# Patient Record
Sex: FEMALE | Employment: OTHER | URBAN - METROPOLITAN AREA
[De-identification: names, ages, dates, MRNs, and addresses within clinical notes are randomized per-mention and may not be internally consistent; named-entity substitution may affect disease eponyms.]

---

## 2020-11-05 ENCOUNTER — HOSPITAL ENCOUNTER (OUTPATIENT)
Age: 75
Setting detail: SPECIMEN
Discharge: HOME | End: 2020-11-05
Payer: MEDICARE

## 2020-11-05 LAB
-: ABNORMAL
ABSOLUTE EOS #: 0.24 K/UL (ref 0–0.44)
ABSOLUTE IMMATURE GRANULOCYTE: 0.07 K/UL (ref 0–0.3)
ABSOLUTE LYMPH #: 2.59 K/UL (ref 1.1–3.7)
ABSOLUTE MONO #: 0.74 K/UL (ref 0.1–1.2)
ALBUMIN SERPL-MCNC: 4 G/DL (ref 3.5–5.2)
ALBUMIN/GLOBULIN RATIO: 1.3 (ref 1–2.5)
ALP BLD-CCNC: 57 U/L (ref 35–104)
ALT SERPL-CCNC: 18 U/L (ref 5–33)
AMORPHOUS: ABNORMAL
ANION GAP SERPL CALCULATED.3IONS-SCNC: 12 MMOL/L (ref 9–17)
AST SERPL-CCNC: 12 U/L
BACTERIA: ABNORMAL
BASOPHILS # BLD: 1 % (ref 0–2)
BASOPHILS ABSOLUTE: 0.07 K/UL (ref 0–0.2)
BILIRUB SERPL-MCNC: 0.24 MG/DL (ref 0.3–1.2)
BILIRUBIN URINE: NEGATIVE
BUN BLDV-MCNC: 18 MG/DL (ref 8–23)
BUN/CREAT BLD: ABNORMAL (ref 9–20)
CALCIUM SERPL-MCNC: 9.8 MG/DL (ref 8.6–10.4)
CASTS UA: ABNORMAL /LPF (ref 0–8)
CHLORIDE BLD-SCNC: 103 MMOL/L (ref 98–107)
CHOLESTEROL/HDL RATIO: 4.1
CHOLESTEROL: 131 MG/DL
CO2: 26 MMOL/L (ref 20–31)
COLOR: YELLOW
COMMENT UA: ABNORMAL
CREAT SERPL-MCNC: 0.77 MG/DL (ref 0.5–0.9)
CRYSTALS, UA: ABNORMAL /HPF
DIFFERENTIAL TYPE: ABNORMAL
EOSINOPHILS RELATIVE PERCENT: 2 % (ref 1–4)
EPITHELIAL CELLS UA: ABNORMAL /HPF (ref 0–5)
ESTIMATED AVERAGE GLUCOSE: 128 MG/DL
GFR AFRICAN AMERICAN: >60 ML/MIN
GFR NON-AFRICAN AMERICAN: >60 ML/MIN
GFR SERPL CREATININE-BSD FRML MDRD: ABNORMAL ML/MIN/{1.73_M2}
GFR SERPL CREATININE-BSD FRML MDRD: ABNORMAL ML/MIN/{1.73_M2}
GLUCOSE BLD-MCNC: 120 MG/DL (ref 70–99)
GLUCOSE URINE: NEGATIVE
HBA1C MFR BLD: 6.1 % (ref 4–6)
HCT VFR BLD CALC: 41.4 % (ref 36.3–47.1)
HDLC SERPL-MCNC: 32 MG/DL
HEMOGLOBIN: 12.6 G/DL (ref 11.9–15.1)
IMMATURE GRANULOCYTES: 1 %
KETONES, URINE: NEGATIVE
LDL CHOLESTEROL: 56 MG/DL (ref 0–130)
LEUKOCYTE ESTERASE, URINE: ABNORMAL
LYMPHOCYTES # BLD: 21 % (ref 24–43)
MCH RBC QN AUTO: 30.2 PG (ref 25.2–33.5)
MCHC RBC AUTO-ENTMCNC: 30.4 G/DL (ref 28.4–34.8)
MCV RBC AUTO: 99.3 FL (ref 82.6–102.9)
MONOCYTES # BLD: 6 % (ref 3–12)
MUCUS: ABNORMAL
NITRITE, URINE: NEGATIVE
NRBC AUTOMATED: 0 PER 100 WBC
OTHER OBSERVATIONS UA: ABNORMAL
PDW BLD-RTO: 13.6 % (ref 11.8–14.4)
PH UA: 5 (ref 5–8)
PLATELET # BLD: 332 K/UL (ref 138–453)
PLATELET ESTIMATE: ABNORMAL
PMV BLD AUTO: 11 FL (ref 8.1–13.5)
POTASSIUM SERPL-SCNC: 3.7 MMOL/L (ref 3.7–5.3)
PROTEIN UA: ABNORMAL
RBC # BLD: 4.17 M/UL (ref 3.95–5.11)
RBC # BLD: ABNORMAL 10*6/UL
RBC UA: ABNORMAL /HPF (ref 0–4)
RENAL EPITHELIAL, UA: ABNORMAL /HPF
SEG NEUTROPHILS: 69 % (ref 36–65)
SEGMENTED NEUTROPHILS ABSOLUTE COUNT: 8.8 K/UL (ref 1.5–8.1)
SODIUM BLD-SCNC: 141 MMOL/L (ref 135–144)
SPECIFIC GRAVITY UA: 1.02 (ref 1–1.03)
TOTAL PROTEIN: 7.2 G/DL (ref 6.4–8.3)
TRICHOMONAS: ABNORMAL
TRIGL SERPL-MCNC: 217 MG/DL
TSH SERPL DL<=0.05 MIU/L-ACNC: 1.3 MIU/L (ref 0.3–5)
TURBIDITY: ABNORMAL
URINE HGB: ABNORMAL
UROBILINOGEN, URINE: NORMAL
VITAMIN D 25-HYDROXY: 41.7 NG/ML (ref 30–100)
VLDLC SERPL CALC-MCNC: ABNORMAL MG/DL (ref 1–30)
WBC # BLD: 12.5 K/UL (ref 3.5–11.3)
WBC # BLD: ABNORMAL 10*3/UL
WBC UA: ABNORMAL /HPF (ref 0–5)
YEAST: ABNORMAL

## 2023-08-18 ENCOUNTER — HOSPITAL ENCOUNTER (INPATIENT)
Age: 78
LOS: 4 days | Discharge: HOME HEALTH | DRG: 637 | End: 2023-08-22
Attending: EMERGENCY MEDICINE | Admitting: INTERNAL MEDICINE
Payer: MEDICARE

## 2023-08-18 ENCOUNTER — APPOINTMENT (OUTPATIENT)
Dept: CT IMAGING | Age: 78
DRG: 637 | End: 2023-08-18
Payer: MEDICARE

## 2023-08-18 ENCOUNTER — APPOINTMENT (OUTPATIENT)
Dept: GENERAL RADIOLOGY | Age: 78
DRG: 637 | End: 2023-08-18
Payer: MEDICARE

## 2023-08-18 ENCOUNTER — APPOINTMENT (OUTPATIENT)
Dept: ULTRASOUND IMAGING | Age: 78
DRG: 637 | End: 2023-08-18
Payer: MEDICARE

## 2023-08-18 DIAGNOSIS — N17.9 ACUTE KIDNEY INJURY (HCC): Primary | ICD-10-CM

## 2023-08-18 DIAGNOSIS — E16.2 HYPOGLYCEMIA: ICD-10-CM

## 2023-08-18 DIAGNOSIS — I31.39 PERICARDIAL EFFUSION: ICD-10-CM

## 2023-08-18 PROBLEM — N28.9 ACUTE RENAL DISEASE: Status: ACTIVE | Noted: 2023-08-18

## 2023-08-18 LAB
ANION GAP SERPL CALCULATED.3IONS-SCNC: 17 MMOL/L (ref 9–17)
BACTERIA URNS QL MICRO: ABNORMAL
BASOPHILS # BLD: 0.14 K/UL (ref 0–0.2)
BASOPHILS NFR BLD: 1 % (ref 0–2)
BILIRUB UR QL STRIP: NEGATIVE
BUN SERPL-MCNC: 83 MG/DL (ref 8–23)
BUN/CREAT SERPL: 22 (ref 9–20)
CALCIUM SERPL-MCNC: 8.9 MG/DL (ref 8.6–10.4)
CHLORIDE SERPL-SCNC: 97 MMOL/L (ref 98–107)
CHP ED QC CHECK: 108
CLARITY UR: ABNORMAL
CO2 SERPL-SCNC: 21 MMOL/L (ref 20–31)
COLOR UR: YELLOW
CREAT SERPL-MCNC: 3.8 MG/DL (ref 0.5–0.9)
EKG ATRIAL RATE: 84 BPM
EKG P AXIS: 68 DEGREES
EKG P-R INTERVAL: 112 MS
EKG Q-T INTERVAL: 418 MS
EKG QRS DURATION: 100 MS
EKG QTC CALCULATION (BAZETT): 493 MS
EKG R AXIS: 74 DEGREES
EKG T AXIS: 43 DEGREES
EKG VENTRICULAR RATE: 84 BPM
EOSINOPHIL # BLD: 0 K/UL (ref 0–0.44)
EOSINOPHILS RELATIVE PERCENT: 0 % (ref 1–4)
EPI CELLS #/AREA URNS HPF: ABNORMAL /HPF (ref 0–5)
ERYTHROCYTE [DISTWIDTH] IN BLOOD BY AUTOMATED COUNT: 16.4 % (ref 11.8–14.4)
GFR SERPL CREATININE-BSD FRML MDRD: 12 ML/MIN/1.73M2
GLUCOSE BLD-MCNC: 108 MG/DL (ref 65–105)
GLUCOSE BLD-MCNC: 18 MG/DL (ref 65–105)
GLUCOSE BLD-MCNC: 29 MG/DL (ref 65–105)
GLUCOSE BLD-MCNC: 48 MG/DL (ref 65–105)
GLUCOSE BLD-MCNC: 49 MG/DL (ref 65–105)
GLUCOSE BLD-MCNC: 52 MG/DL (ref 65–105)
GLUCOSE BLD-MCNC: 57 MG/DL (ref 65–105)
GLUCOSE BLD-MCNC: 61 MG/DL (ref 65–105)
GLUCOSE BLD-MCNC: 69 MG/DL (ref 65–105)
GLUCOSE SERPL-MCNC: 76 MG/DL (ref 70–99)
GLUCOSE UR STRIP-MCNC: NEGATIVE MG/DL
HCT VFR BLD AUTO: 33.4 % (ref 36.3–47.1)
HGB BLD-MCNC: 10.5 G/DL (ref 11.9–15.1)
HGB UR QL STRIP.AUTO: ABNORMAL
IMM GRANULOCYTES # BLD AUTO: 0.14 K/UL (ref 0–0.3)
IMM GRANULOCYTES NFR BLD: 1 %
KETONES UR STRIP-MCNC: NEGATIVE MG/DL
LEUKOCYTE ESTERASE UR QL STRIP: ABNORMAL
LYMPHOCYTES NFR BLD: 0.7 K/UL (ref 1.1–3.7)
LYMPHOCYTES RELATIVE PERCENT: 5 % (ref 24–43)
MCH RBC QN AUTO: 28.3 PG (ref 25.2–33.5)
MCHC RBC AUTO-ENTMCNC: 31.4 G/DL (ref 28.4–34.8)
MCV RBC AUTO: 90 FL (ref 82.6–102.9)
MONOCYTES NFR BLD: 0.28 K/UL (ref 0.1–1.2)
MONOCYTES NFR BLD: 2 % (ref 3–12)
MORPHOLOGY: ABNORMAL
NEUTROPHILS NFR BLD: 91 % (ref 36–65)
NEUTS SEG NFR BLD: 12.64 K/UL (ref 1.5–8.1)
NITRITE UR QL STRIP: NEGATIVE
NRBC BLD-RTO: 0 PER 100 WBC
PH UR STRIP: 5.5 [PH] (ref 5–8)
PLATELET # BLD AUTO: ABNORMAL K/UL (ref 138–453)
PLATELET, FLUORESCENCE: 48 K/UL (ref 138–453)
PLATELETS.RETICULATED NFR BLD AUTO: 21.8 % (ref 1.1–10.3)
POTASSIUM SERPL-SCNC: 3.2 MMOL/L (ref 3.7–5.3)
PROT UR STRIP-MCNC: ABNORMAL MG/DL
RBC # BLD AUTO: 3.71 M/UL (ref 3.95–5.11)
RBC #/AREA URNS HPF: ABNORMAL /HPF (ref 0–2)
SODIUM SERPL-SCNC: 135 MMOL/L (ref 135–144)
SP GR UR STRIP: 1.01 (ref 1–1.03)
URATE SERPL-MCNC: 11.6 MG/DL (ref 2.4–5.7)
UROBILINOGEN UR STRIP-ACNC: NORMAL EU/DL (ref 0–1)
WBC #/AREA URNS HPF: ABNORMAL /HPF (ref 0–5)
WBC OTHER # BLD: 13.9 K/UL (ref 3.5–11.3)
YEAST URNS QL MICRO: ABNORMAL

## 2023-08-18 PROCEDURE — 2580000003 HC RX 258: Performed by: INTERNAL MEDICINE

## 2023-08-18 PROCEDURE — 86335 IMMUNFIX E-PHORSIS/URINE/CSF: CPT

## 2023-08-18 PROCEDURE — 82947 ASSAY GLUCOSE BLOOD QUANT: CPT

## 2023-08-18 PROCEDURE — 70450 CT HEAD/BRAIN W/O DYE: CPT

## 2023-08-18 PROCEDURE — 87040 BLOOD CULTURE FOR BACTERIA: CPT

## 2023-08-18 PROCEDURE — 96374 THER/PROPH/DIAG INJ IV PUSH: CPT

## 2023-08-18 PROCEDURE — 6360000002 HC RX W HCPCS: Performed by: INTERNAL MEDICINE

## 2023-08-18 PROCEDURE — 84550 ASSAY OF BLOOD/URIC ACID: CPT

## 2023-08-18 PROCEDURE — 99285 EMERGENCY DEPT VISIT HI MDM: CPT

## 2023-08-18 PROCEDURE — 87186 SC STD MICRODIL/AGAR DIL: CPT

## 2023-08-18 PROCEDURE — 36415 COLL VENOUS BLD VENIPUNCTURE: CPT

## 2023-08-18 PROCEDURE — 85055 RETICULATED PLATELET ASSAY: CPT

## 2023-08-18 PROCEDURE — 87205 SMEAR GRAM STAIN: CPT

## 2023-08-18 PROCEDURE — 2060000000 HC ICU INTERMEDIATE R&B

## 2023-08-18 PROCEDURE — 84166 PROTEIN E-PHORESIS/URINE/CSF: CPT

## 2023-08-18 PROCEDURE — 87154 CUL TYP ID BLD PTHGN 6+ TRGT: CPT

## 2023-08-18 PROCEDURE — 76770 US EXAM ABDO BACK WALL COMP: CPT

## 2023-08-18 PROCEDURE — 84300 ASSAY OF URINE SODIUM: CPT

## 2023-08-18 PROCEDURE — 81001 URINALYSIS AUTO W/SCOPE: CPT

## 2023-08-18 PROCEDURE — 87077 CULTURE AEROBIC IDENTIFY: CPT

## 2023-08-18 PROCEDURE — 93005 ELECTROCARDIOGRAM TRACING: CPT | Performed by: EMERGENCY MEDICINE

## 2023-08-18 PROCEDURE — 84165 PROTEIN E-PHORESIS SERUM: CPT

## 2023-08-18 PROCEDURE — 86334 IMMUNOFIX E-PHORESIS SERUM: CPT

## 2023-08-18 PROCEDURE — 71045 X-RAY EXAM CHEST 1 VIEW: CPT

## 2023-08-18 PROCEDURE — 6360000002 HC RX W HCPCS: Performed by: EMERGENCY MEDICINE

## 2023-08-18 PROCEDURE — 2500000003 HC RX 250 WO HCPCS

## 2023-08-18 PROCEDURE — 83935 ASSAY OF URINE OSMOLALITY: CPT

## 2023-08-18 PROCEDURE — 84156 ASSAY OF PROTEIN URINE: CPT

## 2023-08-18 PROCEDURE — 2580000003 HC RX 258: Performed by: EMERGENCY MEDICINE

## 2023-08-18 PROCEDURE — 85025 COMPLETE CBC W/AUTO DIFF WBC: CPT

## 2023-08-18 PROCEDURE — 99222 1ST HOSP IP/OBS MODERATE 55: CPT | Performed by: STUDENT IN AN ORGANIZED HEALTH CARE EDUCATION/TRAINING PROGRAM

## 2023-08-18 PROCEDURE — 6370000000 HC RX 637 (ALT 250 FOR IP): Performed by: INTERNAL MEDICINE

## 2023-08-18 PROCEDURE — 80048 BASIC METABOLIC PNL TOTAL CA: CPT

## 2023-08-18 PROCEDURE — 84155 ASSAY OF PROTEIN SERUM: CPT

## 2023-08-18 PROCEDURE — 74018 RADEX ABDOMEN 1 VIEW: CPT

## 2023-08-18 PROCEDURE — 51798 US URINE CAPACITY MEASURE: CPT

## 2023-08-18 PROCEDURE — 86403 PARTICLE AGGLUT ANTBDY SCRN: CPT

## 2023-08-18 RX ORDER — SODIUM CHLORIDE 0.9 % (FLUSH) 0.9 %
10 SYRINGE (ML) INJECTION PRN
Status: DISCONTINUED | OUTPATIENT
Start: 2023-08-18 | End: 2023-08-22 | Stop reason: HOSPADM

## 2023-08-18 RX ORDER — ACETAMINOPHEN 650 MG/1
650 SUPPOSITORY RECTAL EVERY 6 HOURS PRN
Status: DISCONTINUED | OUTPATIENT
Start: 2023-08-18 | End: 2023-08-22 | Stop reason: HOSPADM

## 2023-08-18 RX ORDER — SODIUM CHLORIDE 0.9 % (FLUSH) 0.9 %
5-40 SYRINGE (ML) INJECTION PRN
Status: DISCONTINUED | OUTPATIENT
Start: 2023-08-18 | End: 2023-08-18 | Stop reason: SDUPTHER

## 2023-08-18 RX ORDER — DEXTROSE MONOHYDRATE 100 MG/ML
INJECTION, SOLUTION INTRAVENOUS CONTINUOUS PRN
Status: DISCONTINUED | OUTPATIENT
Start: 2023-08-18 | End: 2023-08-22 | Stop reason: HOSPADM

## 2023-08-18 RX ORDER — POTASSIUM CHLORIDE 20 MEQ/1
40 TABLET, EXTENDED RELEASE ORAL PRN
Status: DISCONTINUED | OUTPATIENT
Start: 2023-08-18 | End: 2023-08-18

## 2023-08-18 RX ORDER — ONDANSETRON 2 MG/ML
4 INJECTION INTRAMUSCULAR; INTRAVENOUS EVERY 6 HOURS PRN
Status: DISCONTINUED | OUTPATIENT
Start: 2023-08-18 | End: 2023-08-22 | Stop reason: HOSPADM

## 2023-08-18 RX ORDER — SODIUM CHLORIDE 9 MG/ML
INJECTION, SOLUTION INTRAVENOUS CONTINUOUS
Status: DISCONTINUED | OUTPATIENT
Start: 2023-08-18 | End: 2023-08-18

## 2023-08-18 RX ORDER — HEPARIN SODIUM 5000 [USP'U]/ML
5000 INJECTION, SOLUTION INTRAVENOUS; SUBCUTANEOUS EVERY 8 HOURS SCHEDULED
Status: DISCONTINUED | OUTPATIENT
Start: 2023-08-18 | End: 2023-08-20

## 2023-08-18 RX ORDER — ONDANSETRON 2 MG/ML
4 INJECTION INTRAMUSCULAR; INTRAVENOUS ONCE
Status: COMPLETED | OUTPATIENT
Start: 2023-08-18 | End: 2023-08-18

## 2023-08-18 RX ORDER — DEXTROSE MONOHYDRATE 100 MG/ML
INJECTION, SOLUTION INTRAVENOUS CONTINUOUS
Status: DISCONTINUED | OUTPATIENT
Start: 2023-08-18 | End: 2023-08-20

## 2023-08-18 RX ORDER — SODIUM CHLORIDE 0.9 % (FLUSH) 0.9 %
5-40 SYRINGE (ML) INJECTION EVERY 12 HOURS SCHEDULED
Status: DISCONTINUED | OUTPATIENT
Start: 2023-08-18 | End: 2023-08-18 | Stop reason: SDUPTHER

## 2023-08-18 RX ORDER — SODIUM CHLORIDE 9 MG/ML
INJECTION, SOLUTION INTRAVENOUS PRN
Status: DISCONTINUED | OUTPATIENT
Start: 2023-08-18 | End: 2023-08-18 | Stop reason: SDUPTHER

## 2023-08-18 RX ORDER — 0.9 % SODIUM CHLORIDE 0.9 %
500 INTRAVENOUS SOLUTION INTRAVENOUS ONCE
Status: COMPLETED | OUTPATIENT
Start: 2023-08-18 | End: 2023-08-18

## 2023-08-18 RX ORDER — MAGNESIUM SULFATE IN WATER 40 MG/ML
2000 INJECTION, SOLUTION INTRAVENOUS PRN
Status: DISCONTINUED | OUTPATIENT
Start: 2023-08-18 | End: 2023-08-18

## 2023-08-18 RX ORDER — SODIUM CHLORIDE 0.9 % (FLUSH) 0.9 %
10 SYRINGE (ML) INJECTION EVERY 12 HOURS SCHEDULED
Status: DISCONTINUED | OUTPATIENT
Start: 2023-08-18 | End: 2023-08-22 | Stop reason: HOSPADM

## 2023-08-18 RX ORDER — SODIUM CHLORIDE 9 MG/ML
INJECTION, SOLUTION INTRAVENOUS PRN
Status: DISCONTINUED | OUTPATIENT
Start: 2023-08-18 | End: 2023-08-22 | Stop reason: HOSPADM

## 2023-08-18 RX ORDER — DEXTROSE MONOHYDRATE 25 G/50ML
INJECTION, SOLUTION INTRAVENOUS
Status: COMPLETED
Start: 2023-08-18 | End: 2023-08-18

## 2023-08-18 RX ORDER — DEXTROSE MONOHYDRATE 25 G/50ML
25 INJECTION, SOLUTION INTRAVENOUS ONCE
Status: COMPLETED | OUTPATIENT
Start: 2023-08-18 | End: 2023-08-18

## 2023-08-18 RX ORDER — ONDANSETRON 4 MG/1
4 TABLET, ORALLY DISINTEGRATING ORAL EVERY 8 HOURS PRN
Status: DISCONTINUED | OUTPATIENT
Start: 2023-08-18 | End: 2023-08-22 | Stop reason: HOSPADM

## 2023-08-18 RX ORDER — ACETAMINOPHEN 325 MG/1
650 TABLET ORAL EVERY 6 HOURS PRN
Status: DISCONTINUED | OUTPATIENT
Start: 2023-08-18 | End: 2023-08-22 | Stop reason: HOSPADM

## 2023-08-18 RX ORDER — POTASSIUM CHLORIDE 7.45 MG/ML
10 INJECTION INTRAVENOUS PRN
Status: DISCONTINUED | OUTPATIENT
Start: 2023-08-18 | End: 2023-08-18

## 2023-08-18 RX ORDER — DEXTROSE AND SODIUM CHLORIDE 5; .9 G/100ML; G/100ML
INJECTION, SOLUTION INTRAVENOUS CONTINUOUS
Status: DISCONTINUED | OUTPATIENT
Start: 2023-08-18 | End: 2023-08-18

## 2023-08-18 RX ADMIN — DEXTROSE MONOHYDRATE 25 G: 25 INJECTION, SOLUTION INTRAVENOUS at 10:22

## 2023-08-18 RX ADMIN — SODIUM CHLORIDE 500 ML: 9 INJECTION, SOLUTION INTRAVENOUS at 12:08

## 2023-08-18 RX ADMIN — SODIUM CHLORIDE: 9 INJECTION, SOLUTION INTRAVENOUS at 09:41

## 2023-08-18 RX ADMIN — HEPARIN SODIUM 5000 UNITS: 5000 INJECTION INTRAVENOUS; SUBCUTANEOUS at 22:56

## 2023-08-18 RX ADMIN — ONDANSETRON 4 MG: 2 INJECTION INTRAMUSCULAR; INTRAVENOUS at 07:39

## 2023-08-18 RX ADMIN — Medication 16 G: at 22:56

## 2023-08-18 RX ADMIN — SODIUM CHLORIDE 500 ML: 9 INJECTION, SOLUTION INTRAVENOUS at 07:39

## 2023-08-18 RX ADMIN — DEXTROSE MONOHYDRATE: 100 INJECTION, SOLUTION INTRAVENOUS at 13:43

## 2023-08-18 ASSESSMENT — ENCOUNTER SYMPTOMS
COLOR CHANGE: 0
VOMITING: 1
CONSTIPATION: 0
EYE REDNESS: 0
ABDOMINAL PAIN: 0
EYE DISCHARGE: 0
NAUSEA: 1
FACIAL SWELLING: 0
SHORTNESS OF BREATH: 0
DIARRHEA: 0
COUGH: 0

## 2023-08-18 NOTE — ED NOTES
Pt found to have altered mental status. POCT glucose 18. 1 amp D50 pushed. D5 and 0.9%NS initiated.       Conchita Romero RN  08/18/23 1092

## 2023-08-18 NOTE — FLOWSHEET NOTE
08/18/23 1620   Treatment Team Notification   Reason for Communication Review case   Name of Team Member Notified Dr Duke Vicente Team Role Attending Provider   Method of Communication Secure Message   Response Waiting for response   Notification Time 26     Dr Joseline Duran notified regarding a fall the pt had at home prior to hospitalization a few days ago where the pt had hit her head, per daughter telling Arnol Navas. When the writer assessed pt there was a slight facial drooping to left side of face. Dr Joseline Duran ordered for pt to have CT of brain wo contrast and consult to neurology.

## 2023-08-18 NOTE — ED NOTES
Requested [facility]  perfectserve admitting for 57 glucose.       Robin Tavera RN  08/18/23 6490

## 2023-08-18 NOTE — ED NOTES
Pt family out to station for socks. Socks placed on pt, safety maintained.       Radha Aguirre RN  08/18/23 2086

## 2023-08-18 NOTE — ED NOTES
Pt to bathroom. Urine sample contaminated with feces. Dr Slater Screws notified.       Violet Curtis RN  08/18/23 5581

## 2023-08-18 NOTE — ED NOTES
S/w Dr Elena Gomez. Give 500ml 0.9% bolus for soft bp. Continue D5 and 0.9%NS infusion for 57 BG. Telephone orders with readback.       Radha Aguirre RN  08/18/23 1538

## 2023-08-18 NOTE — ED PROVIDER NOTES
[facility] ED  EMERGENCY DEPARTMENT ENCOUNTER      Pt Name: Jodi Callaway  MRN: [id number]  [address] 1945  Date of evaluation: 8/18/2023  Provider: Dory Plunkett MD    CHIEF COMPLAINT       Chief Complaint   Patient presents with    Hypoglycemia         HISTORY OF PRESENT ILLNESS  (Location/Symptom, Timing/Onset, Context/Setting, Quality, Duration, Modifying Factors, Severity.)   Jodi Callaway is a 77 y.o. female who presents to the emergency department for low blood sugar. Paramedics checked her blood sugar and it was low and they gave her D50 and she is now awake and alert and talking. She vomited. She does not seem to complain of any pain or shortness of breath. She has not checked her temperature at home. Nursing Notes were reviewed. ALLERGIES     Patient has no allergy information on record. CURRENT MEDICATIONS       Previous Medications    No medications on file       PAST MEDICAL HISTORY   No past medical history on file. SURGICAL HISTORY     No past surgical history on file. FAMILY HISTORY     No family history on file. No family status information on file. SOCIAL HISTORY          REVIEW OF SYSTEMS    (2-9 systems for level 4, 10 or more for level 5)     Review of Systems   Constitutional:  Negative for chills, fatigue and fever. HENT:  Negative for congestion, ear discharge and facial swelling. Eyes:  Negative for discharge and redness. Respiratory:  Negative for cough and shortness of breath. Cardiovascular:  Negative for chest pain. Gastrointestinal:  Positive for nausea and vomiting. Negative for abdominal pain, constipation and diarrhea. Genitourinary:  Negative for dysuria and hematuria. Musculoskeletal:  Negative for arthralgias. Skin:  Negative for color change and rash. Neurological:  Negative for syncope, numbness and headaches. Hematological:  Negative for adenopathy. Psychiatric/Behavioral:  Negative for confusion.  The patient is

## 2023-08-18 NOTE — H&P
History & Physical  [facility].,    Adult Hospitalist      Name: Kaylan Chaudhry  MRN: [id number]     Acct: [de-identified]  Room: [facility]/    Admit Date: 8/18/2023  7:17 AM  PCP: Rip Gomez MD    Primary Problem  Principal Problem:    Acute kidney injury [facility])  Active Problems:    Acute renal disease  Resolved Problems:    * No resolved hospital problems. *        Assesment:     Hypoglycemia  Acute renal failure, likely prerenal  Approximately encephalopathy  Diabetes type 2  Leukocytosis        Plan:   Admit to medical floor on telemetry  Check vitals closely  Start D5 normal 100 cc/h  Hold oral hypoglycemic agents for now  Check retroperitoneal ultrasound  Check was for residual  Consult nephrology  Obtain urinalysis  Close monitoring of the electrolytes and kidney function  Blood cultures x2  Heparin for DVT prophylaxis  Continue to monitor/telemetry/CBC with differential daily/BMP daily  PT and OT  for discharge planning  Continue medications as below      Scheduled Meds:    Continuous Infusions:   sodium chloride 75 mL/hr at 08/18/23 0941     PRN Meds:       Chief Complaint:     Chief Complaint   Patient presents with    Hypoglycemia         History of Present Illness: Kaylan Chaudhry is a 77 y.o.  female who presents with abdominal status, decreased level of consciousness, EMS checked her blood sugar and was found to be significantly low, she received D50 and she was brought to the hospital, blood work in the ER showed acute renal failure with creatinine of 3.8, white blood cells 13.9. I have personally reviewed the past medical history, past surgical history, medications, social history, and family history, and summarized in the note. Review of Systems:     All 10 point system is reviewed and negative otherwise mentioned in HPI. Past Medical History:     No past medical history on file. Past Surgical History:     No past surgical history on file.      Medications

## 2023-08-19 ENCOUNTER — APPOINTMENT (OUTPATIENT)
Dept: CT IMAGING | Age: 78
DRG: 637 | End: 2023-08-19
Payer: MEDICARE

## 2023-08-19 PROBLEM — G92.8 TOXIC METABOLIC ENCEPHALOPATHY: Status: ACTIVE | Noted: 2023-08-19

## 2023-08-19 PROBLEM — E16.2 HYPOGLYCEMIA: Status: ACTIVE | Noted: 2023-08-19

## 2023-08-19 LAB
ANION GAP SERPL CALCULATED.3IONS-SCNC: 13 MMOL/L (ref 9–17)
BASOPHILS # BLD: 0 K/UL (ref 0–0.2)
BASOPHILS NFR BLD: 0 % (ref 0–2)
BUN SERPL-MCNC: 69 MG/DL (ref 8–23)
BUN/CREAT SERPL: 30 (ref 9–20)
CALCIUM SERPL-MCNC: 8.8 MG/DL (ref 8.6–10.4)
CHLORIDE SERPL-SCNC: 101 MMOL/L (ref 98–107)
CO2 SERPL-SCNC: 20 MMOL/L (ref 20–31)
CREAT SERPL-MCNC: 2.3 MG/DL (ref 0.5–0.9)
EOSINOPHIL # BLD: 0 K/UL (ref 0–0.44)
EOSINOPHILS RELATIVE PERCENT: 0 % (ref 1–4)
ERYTHROCYTE [DISTWIDTH] IN BLOOD BY AUTOMATED COUNT: 16.7 % (ref 11.8–14.4)
GFR SERPL CREATININE-BSD FRML MDRD: 21 ML/MIN/1.73M2
GLUCOSE BLD-MCNC: 105 MG/DL (ref 65–105)
GLUCOSE BLD-MCNC: 131 MG/DL (ref 65–105)
GLUCOSE BLD-MCNC: 140 MG/DL (ref 65–105)
GLUCOSE BLD-MCNC: 150 MG/DL (ref 65–105)
GLUCOSE BLD-MCNC: 181 MG/DL (ref 65–105)
GLUCOSE BLD-MCNC: 181 MG/DL (ref 65–105)
GLUCOSE BLD-MCNC: 258 MG/DL (ref 65–105)
GLUCOSE BLD-MCNC: 86 MG/DL (ref 65–105)
GLUCOSE BLD-MCNC: 88 MG/DL (ref 65–105)
GLUCOSE BLD-MCNC: 93 MG/DL (ref 65–105)
GLUCOSE BLD-MCNC: 96 MG/DL (ref 65–105)
GLUCOSE SERPL-MCNC: 84 MG/DL (ref 70–99)
HCT VFR BLD AUTO: 30 % (ref 36.3–47.1)
HGB BLD-MCNC: 9.7 G/DL (ref 11.9–15.1)
IMM GRANULOCYTES # BLD AUTO: 0.11 K/UL (ref 0–0.3)
IMM GRANULOCYTES NFR BLD: 1 %
LYMPHOCYTES NFR BLD: 0.96 K/UL (ref 1.1–3.7)
LYMPHOCYTES RELATIVE PERCENT: 9 % (ref 24–43)
MAGNESIUM SERPL-MCNC: 2.2 MG/DL (ref 1.6–2.6)
MCH RBC QN AUTO: 28.3 PG (ref 25.2–33.5)
MCHC RBC AUTO-ENTMCNC: 32.3 G/DL (ref 28.4–34.8)
MCV RBC AUTO: 87.5 FL (ref 82.6–102.9)
MONOCYTES NFR BLD: 0.64 K/UL (ref 0.1–1.2)
MONOCYTES NFR BLD: 6 % (ref 3–12)
NEUTROPHILS NFR BLD: 84 % (ref 36–65)
NEUTS SEG NFR BLD: 8.99 K/UL (ref 1.5–8.1)
NRBC BLD-RTO: 0 PER 100 WBC
OSMOLALITY UR: 383 MOSM/KG (ref 80–1300)
PLATELET # BLD AUTO: ABNORMAL K/UL (ref 138–453)
PLATELET, FLUORESCENCE: 47 K/UL (ref 138–453)
PLATELETS.RETICULATED NFR BLD AUTO: 22.8 % (ref 1.1–10.3)
POTASSIUM SERPL-SCNC: 3.1 MMOL/L (ref 3.7–5.3)
RBC # BLD AUTO: 3.43 M/UL (ref 3.95–5.11)
SODIUM SERPL-SCNC: 134 MMOL/L (ref 135–144)
SODIUM UR-SCNC: 77 MMOL/L
WBC OTHER # BLD: 10.7 K/UL (ref 3.5–11.3)

## 2023-08-19 PROCEDURE — 2580000003 HC RX 258: Performed by: NURSE PRACTITIONER

## 2023-08-19 PROCEDURE — 2580000003 HC RX 258: Performed by: INTERNAL MEDICINE

## 2023-08-19 PROCEDURE — 82947 ASSAY GLUCOSE BLOOD QUANT: CPT

## 2023-08-19 PROCEDURE — 83735 ASSAY OF MAGNESIUM: CPT

## 2023-08-19 PROCEDURE — 36415 COLL VENOUS BLD VENIPUNCTURE: CPT

## 2023-08-19 PROCEDURE — 80048 BASIC METABOLIC PNL TOTAL CA: CPT

## 2023-08-19 PROCEDURE — 85025 COMPLETE CBC W/AUTO DIFF WBC: CPT

## 2023-08-19 PROCEDURE — 6360000002 HC RX W HCPCS: Performed by: NURSE PRACTITIONER

## 2023-08-19 PROCEDURE — 85055 RETICULATED PLATELET ASSAY: CPT

## 2023-08-19 PROCEDURE — 6370000000 HC RX 637 (ALT 250 FOR IP): Performed by: INTERNAL MEDICINE

## 2023-08-19 PROCEDURE — 6360000002 HC RX W HCPCS: Performed by: INTERNAL MEDICINE

## 2023-08-19 PROCEDURE — 74176 CT ABD & PELVIS W/O CONTRAST: CPT

## 2023-08-19 PROCEDURE — 2060000000 HC ICU INTERMEDIATE R&B

## 2023-08-19 RX ORDER — GLIMEPIRIDE 2 MG/1
2 TABLET ORAL DAILY
Status: ON HOLD | COMMUNITY
Start: 2023-06-05 | End: 2023-08-21 | Stop reason: HOSPADM

## 2023-08-19 RX ORDER — ASPIRIN 81 MG/1
81 TABLET ORAL DAILY
Status: DISCONTINUED | OUTPATIENT
Start: 2023-08-19 | End: 2023-08-22 | Stop reason: HOSPADM

## 2023-08-19 RX ORDER — ASPIRIN 81 MG/1
81 TABLET, COATED ORAL DAILY
COMMUNITY
Start: 2023-08-08

## 2023-08-19 RX ORDER — AMLODIPINE BESYLATE 10 MG/1
10 TABLET ORAL DAILY
Status: ON HOLD | COMMUNITY
Start: 2023-08-08 | End: 2023-08-22 | Stop reason: HOSPADM

## 2023-08-19 RX ORDER — MIDODRINE HYDROCHLORIDE 10 MG/1
10 TABLET ORAL 3 TIMES DAILY PRN
Status: DISCONTINUED | OUTPATIENT
Start: 2023-08-19 | End: 2023-08-22 | Stop reason: HOSPADM

## 2023-08-19 RX ORDER — LEVOTHYROXINE SODIUM 0.03 MG/1
25 TABLET ORAL EVERY MORNING
COMMUNITY
Start: 2023-05-12

## 2023-08-19 RX ORDER — POTASSIUM CHLORIDE 20 MEQ/1
40 TABLET, EXTENDED RELEASE ORAL PRN
Status: DISCONTINUED | OUTPATIENT
Start: 2023-08-19 | End: 2023-08-19

## 2023-08-19 RX ORDER — LEVOTHYROXINE SODIUM 0.03 MG/1
25 TABLET ORAL DAILY
Status: DISCONTINUED | OUTPATIENT
Start: 2023-08-19 | End: 2023-08-22 | Stop reason: HOSPADM

## 2023-08-19 RX ORDER — HYDROCHLOROTHIAZIDE 25 MG/1
25 TABLET ORAL DAILY
Status: ON HOLD | COMMUNITY
Start: 2023-08-08 | End: 2023-08-22 | Stop reason: HOSPADM

## 2023-08-19 RX ORDER — LISINOPRIL 40 MG/1
40 TABLET ORAL DAILY
Status: ON HOLD | COMMUNITY
Start: 2023-08-03 | End: 2023-08-22 | Stop reason: HOSPADM

## 2023-08-19 RX ORDER — POTASSIUM CHLORIDE 7.45 MG/ML
10 INJECTION INTRAVENOUS PRN
Status: DISCONTINUED | OUTPATIENT
Start: 2023-08-19 | End: 2023-08-19

## 2023-08-19 RX ORDER — MULTIVIT-MIN/IRON/FOLIC ACID/K 18-600-40
2000 CAPSULE ORAL DAILY
COMMUNITY

## 2023-08-19 RX ORDER — POTASSIUM CHLORIDE 20 MEQ/1
40 TABLET, EXTENDED RELEASE ORAL ONCE
Status: COMPLETED | OUTPATIENT
Start: 2023-08-19 | End: 2023-08-19

## 2023-08-19 RX ORDER — ONDANSETRON 4 MG/1
TABLET, ORALLY DISINTEGRATING ORAL
COMMUNITY
Start: 2023-08-16

## 2023-08-19 RX ORDER — SIMVASTATIN 40 MG
40 TABLET ORAL NIGHTLY
COMMUNITY

## 2023-08-19 RX ADMIN — PIPERACILLIN AND TAZOBACTAM 4500 MG: 4; .5 INJECTION, POWDER, LYOPHILIZED, FOR SOLUTION INTRAVENOUS at 17:11

## 2023-08-19 RX ADMIN — SODIUM CHLORIDE, PRESERVATIVE FREE 10 ML: 5 INJECTION INTRAVENOUS at 20:18

## 2023-08-19 RX ADMIN — SODIUM CHLORIDE, PRESERVATIVE FREE 10 ML: 5 INJECTION INTRAVENOUS at 09:37

## 2023-08-19 RX ADMIN — POTASSIUM CHLORIDE 40 MEQ: 1500 TABLET, EXTENDED RELEASE ORAL at 09:37

## 2023-08-19 RX ADMIN — LEVOTHYROXINE SODIUM 25 MCG: 25 TABLET ORAL at 10:41

## 2023-08-19 RX ADMIN — HEPARIN SODIUM 5000 UNITS: 5000 INJECTION INTRAVENOUS; SUBCUTANEOUS at 20:18

## 2023-08-19 RX ADMIN — DEXTROSE MONOHYDRATE: 100 INJECTION, SOLUTION INTRAVENOUS at 21:57

## 2023-08-19 RX ADMIN — HEPARIN SODIUM 5000 UNITS: 5000 INJECTION INTRAVENOUS; SUBCUTANEOUS at 14:55

## 2023-08-19 RX ADMIN — ASPIRIN 81 MG: 81 TABLET, COATED ORAL at 10:41

## 2023-08-19 NOTE — CARE COORDINATION
Case Management Assessment  Initial Evaluation    Date/Time of Evaluation: 8/19/2023 10:01 AM  Assessment Completed by: Vicky Cook RN    If patient is discharged prior to next notation, then this note serves as note for discharge by case management. Patient Name: Sarai Mora                   YOB: 1945  Diagnosis: Hypoglycemia [E16.2]  Acute renal disease [N28.9]  Acute kidney injury [facility]) [N17.9]                   Date / Time: 8/18/2023  7:17 AM    Patient Admission Status: Inpatient   Readmission Risk (Low < 19, Mod (19-27), High > 27): Readmission Risk Score: 12.6    Current PCP: Jaja Summers MD  PCP verified by CM? Yes    Chart Reviewed: Yes      History Provided by: Patient  Patient Orientation: Alert and Oriented    Patient Cognition: Alert    Hospitalization in the last 30 days (Readmission):  No    If yes, Readmission Assessment in CM Navigator will be completed. Advance Directives:      Code Status: Full Code   Patient's Primary Decision Maker is:  self       Discharge Planning:    Patient lives with: Spouse/Significant Other Type of Home: House  Primary Care Giver: Self  Patient Support Systems include: Spouse/Significant Other, Children   Current Financial resources: Medicare  Current community resources:    Current services prior to admission: Durable Medical Equipment            Current DME: Walker, Shower Chair, Glucometer            Type of Home Care services:  None    ADLS  Prior functional level: Independent in ADLs/IADLs  Current functional level: Independent in ADLs/IADLs, Mobility (pending PT/OT)    PT AM-PAC:   /24  OT AM-PAC:   /24    Family can provide assistance at DC: Yes  Would you like Case Management to discuss the discharge plan with any other family members/significant others, and if so, who?  Yes (pending PT/OT recs if needs anything)  Plans to Return to Present Housing: Yes  Other Identified Issues/Barriers to RETURNING to current housing: weakness, fall

## 2023-08-19 NOTE — ACP (ADVANCE CARE PLANNING)
Advance Care Planning     Advance Care Planning Activator (Inpatient)  Conversation Note      Date of ACP Conversation: 8/19/2023     Conversation Conducted with: Patient with Decision Making Capacity    ACP Activator: Ivana Oakley RN        Health Care Decision Maker: self     Current Designated Health Care Decision Maker: self     Click here to complete Healthcare Decision Makers including section of the Healthcare Decision Maker Relationship (ie \"Primary\")  Today we documented Decision Maker(s). The patient will provide ACP documents. Care Preferences    Ventilation: \"If you were in your present state of health and suddenly became very ill and were unable to breathe on your own, what would your preference be about the use of a ventilator (breathing machine) if it were available to you? \"      Would the patient desire the use of ventilator (breathing machine)?: yes    Pt relays she does have a HCPOA/Living Will. Cannot remember if spouse or dtr is HCPOA. Agrees with Full code status. [] Yes   [] No   Educated Patient / [place] regarding differences between Advance Directives and portable DNR orders.     Length of ACP Conversation in minutes:  10    Conversation Outcomes:  ACP discussion completed    Follow-up plan:    [] Schedule follow-up conversation to continue planning  [x] Referred individual to Provider for additional questions/concerns   [] Advised patient/agent/surrogate to review completed ACP document and update if needed with changes in condition, patient preferences or care setting    [] This note routed to one or more involved healthcare providers

## 2023-08-20 PROBLEM — N12 PYELONEPHRITIS OF RIGHT KIDNEY: Status: ACTIVE | Noted: 2023-08-20

## 2023-08-20 PROBLEM — A41.51 E. COLI SEPTICEMIA (HCC): Status: ACTIVE | Noted: 2023-08-20

## 2023-08-20 PROBLEM — N28.1 COMPLEX RENAL CYST: Status: ACTIVE | Noted: 2023-08-20

## 2023-08-20 LAB
ALBUMIN SERPL-MCNC: 2.6 G/DL (ref 3.5–5.2)
ALP SERPL-CCNC: 72 U/L (ref 35–104)
ALT SERPL-CCNC: 16 U/L (ref 5–33)
ANION GAP SERPL CALCULATED.3IONS-SCNC: 11 MMOL/L (ref 9–17)
AST SERPL-CCNC: 13 U/L
BASOPHILS # BLD: 0.03 K/UL (ref 0–0.2)
BASOPHILS NFR BLD: 0 % (ref 0–2)
BILIRUB DIRECT SERPL-MCNC: 0.1 MG/DL
BILIRUB INDIRECT SERPL-MCNC: 0.4 MG/DL (ref 0–1)
BILIRUB SERPL-MCNC: 0.5 MG/DL (ref 0.3–1.2)
BUN SERPL-MCNC: 63 MG/DL (ref 8–23)
BUN/CREAT SERPL: 35 (ref 9–20)
CALCIUM SERPL-MCNC: 8.9 MG/DL (ref 8.6–10.4)
CHLORIDE SERPL-SCNC: 103 MMOL/L (ref 98–107)
CO2 SERPL-SCNC: 20 MMOL/L (ref 20–31)
CREAT SERPL-MCNC: 1.8 MG/DL (ref 0.5–0.9)
D DIMER PPP FEU-MCNC: 2.12 UG/ML FEU (ref 0–0.59)
EOSINOPHIL # BLD: 0.17 K/UL (ref 0–0.44)
EOSINOPHILS RELATIVE PERCENT: 2 % (ref 1–4)
ERYTHROCYTE [DISTWIDTH] IN BLOOD BY AUTOMATED COUNT: 16.8 % (ref 11.8–14.4)
FIBRINOGEN PPP-MCNC: 636 MG/DL (ref 179–518)
FOLATE SERPL-MCNC: 6.8 NG/ML
GFR SERPL CREATININE-BSD FRML MDRD: 28 ML/MIN/1.73M2
GLUCOSE BLD-MCNC: 172 MG/DL (ref 65–105)
GLUCOSE BLD-MCNC: 184 MG/DL (ref 65–105)
GLUCOSE BLD-MCNC: 194 MG/DL (ref 65–105)
GLUCOSE BLD-MCNC: 197 MG/DL (ref 65–105)
GLUCOSE BLD-MCNC: 199 MG/DL (ref 65–105)
GLUCOSE BLD-MCNC: 202 MG/DL (ref 65–105)
GLUCOSE BLD-MCNC: 215 MG/DL (ref 65–105)
GLUCOSE BLD-MCNC: 239 MG/DL (ref 65–105)
GLUCOSE SERPL-MCNC: 187 MG/DL (ref 70–99)
HCT VFR BLD AUTO: 30.2 % (ref 36.3–47.1)
HGB BLD-MCNC: 9.6 G/DL (ref 11.9–15.1)
IMM GRANULOCYTES # BLD AUTO: 0.15 K/UL (ref 0–0.3)
IMM GRANULOCYTES NFR BLD: 1 %
INR PPP: 1.2
IRON SATN MFR SERPL: 40 % (ref 20–55)
IRON SERPL-MCNC: 74 UG/DL (ref 37–145)
LYMPHOCYTES NFR BLD: 1.33 K/UL (ref 1.1–3.7)
LYMPHOCYTES RELATIVE PERCENT: 12 % (ref 24–43)
MAGNESIUM SERPL-MCNC: 2.3 MG/DL (ref 1.6–2.6)
MCH RBC QN AUTO: 28 PG (ref 25.2–33.5)
MCHC RBC AUTO-ENTMCNC: 31.8 G/DL (ref 28.4–34.8)
MCV RBC AUTO: 88 FL (ref 82.6–102.9)
MICROORGANISM SPEC CULT: ABNORMAL
MONOCYTES NFR BLD: 0.91 K/UL (ref 0.1–1.2)
MONOCYTES NFR BLD: 8 % (ref 3–12)
NEUTROPHILS NFR BLD: 77 % (ref 36–65)
NEUTS SEG NFR BLD: 8.74 K/UL (ref 1.5–8.1)
NRBC BLD-RTO: 0 PER 100 WBC
PARTIAL THROMBOPLASTIN TIME: 29.9 SEC (ref 23.9–33.8)
PLATELET # BLD AUTO: 57 K/UL (ref 138–453)
PMV BLD AUTO: 14 FL (ref 8.1–13.5)
POTASSIUM SERPL-SCNC: 3.6 MMOL/L (ref 3.7–5.3)
PROT SERPL-MCNC: 5.9 G/DL (ref 6.4–8.3)
PROTHROMBIN TIME: 15.1 SEC (ref 11.5–14.2)
RBC # BLD AUTO: 3.43 M/UL (ref 3.95–5.11)
RBC # BLD: ABNORMAL 10*6/UL
SERVICE CMNT-IMP: ABNORMAL
SODIUM SERPL-SCNC: 134 MMOL/L (ref 135–144)
SPECIMEN DESCRIPTION: ABNORMAL
TIBC SERPL-MCNC: 183 UG/DL (ref 250–450)
UNSATURATED IRON BINDING CAPACITY: 109 UG/DL (ref 112–347)
VIT B12 SERPL-MCNC: 544 PG/ML (ref 232–1245)
WBC OTHER # BLD: 11.3 K/UL (ref 3.5–11.3)

## 2023-08-20 PROCEDURE — 6360000002 HC RX W HCPCS: Performed by: NURSE PRACTITIONER

## 2023-08-20 PROCEDURE — 83735 ASSAY OF MAGNESIUM: CPT

## 2023-08-20 PROCEDURE — 80048 BASIC METABOLIC PNL TOTAL CA: CPT

## 2023-08-20 PROCEDURE — 2060000000 HC ICU INTERMEDIATE R&B

## 2023-08-20 PROCEDURE — 85384 FIBRINOGEN ACTIVITY: CPT

## 2023-08-20 PROCEDURE — 85025 COMPLETE CBC W/AUTO DIFF WBC: CPT

## 2023-08-20 PROCEDURE — 2580000003 HC RX 258: Performed by: INTERNAL MEDICINE

## 2023-08-20 PROCEDURE — 36415 COLL VENOUS BLD VENIPUNCTURE: CPT

## 2023-08-20 PROCEDURE — 82746 ASSAY OF FOLIC ACID SERUM: CPT

## 2023-08-20 PROCEDURE — 97166 OT EVAL MOD COMPLEX 45 MIN: CPT

## 2023-08-20 PROCEDURE — 2580000003 HC RX 258: Performed by: NURSE PRACTITIONER

## 2023-08-20 PROCEDURE — 97535 SELF CARE MNGMENT TRAINING: CPT

## 2023-08-20 PROCEDURE — 85379 FIBRIN DEGRADATION QUANT: CPT

## 2023-08-20 PROCEDURE — 85730 THROMBOPLASTIN TIME PARTIAL: CPT

## 2023-08-20 PROCEDURE — 80076 HEPATIC FUNCTION PANEL: CPT

## 2023-08-20 PROCEDURE — 87086 URINE CULTURE/COLONY COUNT: CPT

## 2023-08-20 PROCEDURE — 82607 VITAMIN B-12: CPT

## 2023-08-20 PROCEDURE — 97116 GAIT TRAINING THERAPY: CPT

## 2023-08-20 PROCEDURE — 83540 ASSAY OF IRON: CPT

## 2023-08-20 PROCEDURE — 6360000002 HC RX W HCPCS: Performed by: INTERNAL MEDICINE

## 2023-08-20 PROCEDURE — 97530 THERAPEUTIC ACTIVITIES: CPT

## 2023-08-20 PROCEDURE — 99222 1ST HOSP IP/OBS MODERATE 55: CPT | Performed by: INTERNAL MEDICINE

## 2023-08-20 PROCEDURE — 82947 ASSAY GLUCOSE BLOOD QUANT: CPT

## 2023-08-20 PROCEDURE — 6370000000 HC RX 637 (ALT 250 FOR IP): Performed by: INTERNAL MEDICINE

## 2023-08-20 PROCEDURE — 97162 PT EVAL MOD COMPLEX 30 MIN: CPT

## 2023-08-20 PROCEDURE — 83550 IRON BINDING TEST: CPT

## 2023-08-20 PROCEDURE — 85610 PROTHROMBIN TIME: CPT

## 2023-08-20 RX ORDER — INSULIN LISPRO 100 [IU]/ML
0-4 INJECTION, SOLUTION INTRAVENOUS; SUBCUTANEOUS NIGHTLY
Status: DISCONTINUED | OUTPATIENT
Start: 2023-08-20 | End: 2023-08-22 | Stop reason: HOSPADM

## 2023-08-20 RX ORDER — INSULIN LISPRO 100 [IU]/ML
0-4 INJECTION, SOLUTION INTRAVENOUS; SUBCUTANEOUS
Status: DISCONTINUED | OUTPATIENT
Start: 2023-08-21 | End: 2023-08-22 | Stop reason: HOSPADM

## 2023-08-20 RX ORDER — DEXTROSE AND SODIUM CHLORIDE 5; .9 G/100ML; G/100ML
INJECTION, SOLUTION INTRAVENOUS CONTINUOUS
Status: DISCONTINUED | OUTPATIENT
Start: 2023-08-20 | End: 2023-08-22

## 2023-08-20 RX ADMIN — SODIUM CHLORIDE: 9 INJECTION, SOLUTION INTRAVENOUS at 00:25

## 2023-08-20 RX ADMIN — DEXTROSE AND SODIUM CHLORIDE: 5; 900 INJECTION, SOLUTION INTRAVENOUS at 13:25

## 2023-08-20 RX ADMIN — ASPIRIN 81 MG: 81 TABLET, COATED ORAL at 09:07

## 2023-08-20 RX ADMIN — PIPERACILLIN AND TAZOBACTAM 3375 MG: 3; .375 INJECTION, POWDER, LYOPHILIZED, FOR SOLUTION INTRAVENOUS at 18:16

## 2023-08-20 RX ADMIN — PIPERACILLIN AND TAZOBACTAM 3375 MG: 3; .375 INJECTION, POWDER, LYOPHILIZED, FOR SOLUTION INTRAVENOUS at 09:20

## 2023-08-20 RX ADMIN — LEVOTHYROXINE SODIUM 25 MCG: 25 TABLET ORAL at 05:04

## 2023-08-20 RX ADMIN — PIPERACILLIN AND TAZOBACTAM 3375 MG: 3; .375 INJECTION, POWDER, LYOPHILIZED, FOR SOLUTION INTRAVENOUS at 00:26

## 2023-08-20 RX ADMIN — SODIUM CHLORIDE, PRESERVATIVE FREE 10 ML: 5 INJECTION INTRAVENOUS at 09:10

## 2023-08-20 ASSESSMENT — ENCOUNTER SYMPTOMS
COLOR CHANGE: 0
ABDOMINAL DISTENTION: 0
EYE DISCHARGE: 0
APNEA: 0

## 2023-08-21 ENCOUNTER — APPOINTMENT (OUTPATIENT)
Dept: GENERAL RADIOLOGY | Age: 78
DRG: 637 | End: 2023-08-21
Payer: MEDICARE

## 2023-08-21 ENCOUNTER — APPOINTMENT (OUTPATIENT)
Age: 78
DRG: 637 | End: 2023-08-21
Attending: INTERNAL MEDICINE
Payer: MEDICARE

## 2023-08-21 LAB
ALBUMIN PERCENT: 48 % (ref 45–65)
ALBUMIN SERPL-MCNC: 2.8 G/DL (ref 3.2–5.2)
ALPHA 2 PERCENT: 16 % (ref 6–13)
ALPHA1 GLOB SERPL ELPH-MCNC: 0.3 G/DL (ref 0.1–0.4)
ALPHA1 GLOB SERPL ELPH-MCNC: 5 % (ref 3–6)
ALPHA2 GLOB SERPL ELPH-MCNC: 0.9 G/DL (ref 0.5–0.9)
ANION GAP SERPL CALCULATED.3IONS-SCNC: 9 MMOL/L (ref 9–17)
B-GLOBULIN SERPL ELPH-MCNC: 0.8 G/DL (ref 0.5–1.1)
B-GLOBULIN SERPL ELPH-MCNC: 14 % (ref 11–19)
BASOPHILS # BLD: 0.04 K/UL (ref 0–0.2)
BASOPHILS NFR BLD: 0 % (ref 0–2)
BUN SERPL-MCNC: 42 MG/DL (ref 8–23)
BUN/CREAT SERPL: 32 (ref 9–20)
CALCIUM SERPL-MCNC: 8.8 MG/DL (ref 8.6–10.4)
CHLORIDE SERPL-SCNC: 109 MMOL/L (ref 98–107)
CO2 SERPL-SCNC: 22 MMOL/L (ref 20–31)
CREAT SERPL-MCNC: 1.3 MG/DL (ref 0.5–0.9)
EOSINOPHIL # BLD: 0.17 K/UL (ref 0–0.44)
EOSINOPHILS RELATIVE PERCENT: 2 % (ref 1–4)
ERYTHROCYTE [DISTWIDTH] IN BLOOD BY AUTOMATED COUNT: 16.8 % (ref 11.8–14.4)
GAMMA GLOB SERPL ELPH-MCNC: 1 G/DL (ref 0.5–1.5)
GAMMA GLOBULIN %: 17 % (ref 9–20)
GFR SERPL CREATININE-BSD FRML MDRD: 42 ML/MIN/1.73M2
GLUCOSE BLD-MCNC: 125 MG/DL (ref 65–105)
GLUCOSE BLD-MCNC: 135 MG/DL (ref 65–105)
GLUCOSE BLD-MCNC: 144 MG/DL (ref 65–105)
GLUCOSE BLD-MCNC: 167 MG/DL (ref 65–105)
GLUCOSE SERPL-MCNC: 154 MG/DL (ref 70–99)
HCT VFR BLD AUTO: 29.4 % (ref 36.3–47.1)
HGB BLD-MCNC: 8.9 G/DL (ref 11.9–15.1)
IMM GRANULOCYTES # BLD AUTO: 0.38 K/UL (ref 0–0.3)
IMM GRANULOCYTES NFR BLD: 4 %
INTERPRETATION SERPL IFE-IMP: NORMAL
LYMPHOCYTES NFR BLD: 1.53 K/UL (ref 1.1–3.7)
LYMPHOCYTES RELATIVE PERCENT: 15 % (ref 24–43)
MAGNESIUM SERPL-MCNC: 2.3 MG/DL (ref 1.6–2.6)
MCH RBC QN AUTO: 27.8 PG (ref 25.2–33.5)
MCHC RBC AUTO-ENTMCNC: 30.3 G/DL (ref 28.4–34.8)
MCV RBC AUTO: 91.9 FL (ref 82.6–102.9)
MICROORGANISM SPEC CULT: ABNORMAL
MICROORGANISM SPEC CULT: NORMAL
MONOCYTES NFR BLD: 0.98 K/UL (ref 0.1–1.2)
MONOCYTES NFR BLD: 10 % (ref 3–12)
NEUTROPHILS NFR BLD: 70 % (ref 36–65)
NEUTS SEG NFR BLD: 7.21 K/UL (ref 1.5–8.1)
NRBC BLD-RTO: 0 PER 100 WBC
PATH REV: NORMAL
PATHOLOGIST: ABNORMAL
PLATELET # BLD AUTO: 101 K/UL (ref 138–453)
PMV BLD AUTO: 12.5 FL (ref 8.1–13.5)
POTASSIUM SERPL-SCNC: 3.5 MMOL/L (ref 3.7–5.3)
PROT PATTERN SERPL ELPH-IMP: ABNORMAL
PROT SERPL-MCNC: 5.7 G/DL (ref 6.4–8.3)
RBC # BLD AUTO: 3.2 M/UL (ref 3.95–5.11)
RBC # BLD: ABNORMAL 10*6/UL
SERVICE CMNT-IMP: ABNORMAL
SODIUM SERPL-SCNC: 140 MMOL/L (ref 135–144)
SPECIMEN DESCRIPTION: ABNORMAL
SPECIMEN DESCRIPTION: NORMAL
TOTAL PROT. SUM,%: 100 % (ref 98–102)
TOTAL PROT. SUM: 5.8 G/DL (ref 6.3–8.2)
WBC OTHER # BLD: 10.3 K/UL (ref 3.5–11.3)

## 2023-08-21 PROCEDURE — 74018 RADEX ABDOMEN 1 VIEW: CPT

## 2023-08-21 PROCEDURE — 93306 TTE W/DOPPLER COMPLETE: CPT

## 2023-08-21 PROCEDURE — 2580000003 HC RX 258: Performed by: NURSE PRACTITIONER

## 2023-08-21 PROCEDURE — 82947 ASSAY GLUCOSE BLOOD QUANT: CPT

## 2023-08-21 PROCEDURE — 80048 BASIC METABOLIC PNL TOTAL CA: CPT

## 2023-08-21 PROCEDURE — 2060000000 HC ICU INTERMEDIATE R&B

## 2023-08-21 PROCEDURE — 97116 GAIT TRAINING THERAPY: CPT

## 2023-08-21 PROCEDURE — 97110 THERAPEUTIC EXERCISES: CPT

## 2023-08-21 PROCEDURE — 6370000000 HC RX 637 (ALT 250 FOR IP): Performed by: INTERNAL MEDICINE

## 2023-08-21 PROCEDURE — 6360000002 HC RX W HCPCS: Performed by: INTERNAL MEDICINE

## 2023-08-21 PROCEDURE — 97530 THERAPEUTIC ACTIVITIES: CPT

## 2023-08-21 PROCEDURE — 36415 COLL VENOUS BLD VENIPUNCTURE: CPT

## 2023-08-21 PROCEDURE — 83735 ASSAY OF MAGNESIUM: CPT

## 2023-08-21 PROCEDURE — 99233 SBSQ HOSP IP/OBS HIGH 50: CPT | Performed by: INTERNAL MEDICINE

## 2023-08-21 PROCEDURE — 85025 COMPLETE CBC W/AUTO DIFF WBC: CPT

## 2023-08-21 PROCEDURE — 97535 SELF CARE MNGMENT TRAINING: CPT

## 2023-08-21 PROCEDURE — 2580000003 HC RX 258: Performed by: INTERNAL MEDICINE

## 2023-08-21 RX ADMIN — PIPERACILLIN AND TAZOBACTAM 3375 MG: 3; .375 INJECTION, POWDER, LYOPHILIZED, FOR SOLUTION INTRAVENOUS at 11:37

## 2023-08-21 RX ADMIN — ASPIRIN 81 MG: 81 TABLET, COATED ORAL at 08:28

## 2023-08-21 RX ADMIN — DEXTROSE AND SODIUM CHLORIDE: 5; 900 INJECTION, SOLUTION INTRAVENOUS at 04:07

## 2023-08-21 RX ADMIN — PIPERACILLIN AND TAZOBACTAM 3375 MG: 3; .375 INJECTION, POWDER, LYOPHILIZED, FOR SOLUTION INTRAVENOUS at 02:06

## 2023-08-21 RX ADMIN — PIPERACILLIN AND TAZOBACTAM 3375 MG: 3; .375 INJECTION, POWDER, LYOPHILIZED, FOR SOLUTION INTRAVENOUS at 18:01

## 2023-08-21 RX ADMIN — LEVOTHYROXINE SODIUM 25 MCG: 25 TABLET ORAL at 08:28

## 2023-08-21 ASSESSMENT — ENCOUNTER SYMPTOMS
GASTROINTESTINAL NEGATIVE: 1
RESPIRATORY NEGATIVE: 1

## 2023-08-21 NOTE — ACP (ADVANCE CARE PLANNING)
..Advance Care Planning     Advance Care Planning Activator (Inpatient)  Conversation Note      Date of ACP Conversation: 8/21/2023     Conversation Conducted with: Patient with Decision Making Capacity    ACP Activator: Jun Egan RN         Health Care Decision Maker:     Current Designated Health Care Decision Maker: SELF    Primary Decision MakerMelizabeth Sanz [facility] - [phone]       Care Preferences    Ventilation: \"If you were in your present state of health and suddenly became very ill and were unable to breathe on your own, what would your preference be about the use of a ventilator (breathing machine) if it were available to you? \"      Would the patient desire the use of ventilator (breathing machine)?: no    \"If your health worsens and it becomes clear that your chance of recovery is unlikely, what would your preference be about the use of a ventilator (breathing machine) if it were available to you? \"     Would the patient desire the use of ventilator (breathing machine)?: No      Resuscitation  \"CPR works best to restart the heart when there is a sudden event, like a heart attack, in someone who is otherwise healthy. Unfortunately, CPR does not typically restart the heart for people who have serious health conditions or who are very sick. \"    \"In the event your heart stopped as a result of an underlying serious health condition, would you want attempts to be made to restart your heart (answer \"yes\" for attempt to resuscitate) or would you prefer a natural death (answer \"no\" for do not attempt to resuscitate)? \" no       [] Yes   [] No   Educated Patient / [place] regarding differences between Advance Directives and portable DNR orders.               Follow-up plan:    [] Schedule follow-up conversation to continue planning  [] Referred individual to Provider for additional questions/concerns   [] Advised patient/agent/surrogate to review completed ACP document and update if needed

## 2023-08-21 NOTE — CARE COORDINATION
Discharge planning    Chart reviewed. Patient lives with spouse, independent and drove pta. Has RW and SC if needed. Admitted with SAUL and sepsis. Urology consulted for staghorn calculus and KUB ordered for today. ID following for e coli septicemia. On zosyn at this time. [facility] to follow. Hem/Onc. Consulted for left breast mass. She is refusing any bx. Nephro for SAUL. Per PT notes she walked 18 feet x 2 with rw. Improved steadiness. Will watch for potential need for home therapy. Will follow up on todays assessment. 1445  Met with patient to discuss plan of care. Discussed potential for home care and therapy notes. She is declining home care at this time but will think about it. Encouraged to ask for writer if she should change her mind.

## 2023-08-22 VITALS
WEIGHT: 160 LBS | TEMPERATURE: 97.8 F | HEART RATE: 100 BPM | BODY MASS INDEX: 29.44 KG/M2 | OXYGEN SATURATION: 95 % | DIASTOLIC BLOOD PRESSURE: 59 MMHG | SYSTOLIC BLOOD PRESSURE: 130 MMHG | HEIGHT: 62 IN | RESPIRATION RATE: 16 BRPM

## 2023-08-22 LAB
ANION GAP SERPL CALCULATED.3IONS-SCNC: 7 MMOL/L (ref 9–17)
BASOPHILS # BLD: 0.04 K/UL (ref 0–0.2)
BASOPHILS NFR BLD: 0 % (ref 0–2)
BUN SERPL-MCNC: 27 MG/DL (ref 8–23)
BUN/CREAT SERPL: 25 (ref 9–20)
CALCIUM SERPL-MCNC: 8.8 MG/DL (ref 8.6–10.4)
CHLORIDE SERPL-SCNC: 107 MMOL/L (ref 98–107)
CO2 SERPL-SCNC: 25 MMOL/L (ref 20–31)
CREAT SERPL-MCNC: 1.1 MG/DL (ref 0.5–0.9)
EOSINOPHIL # BLD: 0.18 K/UL (ref 0–0.44)
EOSINOPHILS RELATIVE PERCENT: 2 % (ref 1–4)
ERYTHROCYTE [DISTWIDTH] IN BLOOD BY AUTOMATED COUNT: 16.5 % (ref 11.8–14.4)
EST. AVERAGE GLUCOSE BLD GHB EST-MCNC: 131 MG/DL
GFR SERPL CREATININE-BSD FRML MDRD: 51 ML/MIN/1.73M2
GLUCOSE BLD-MCNC: 122 MG/DL (ref 65–105)
GLUCOSE BLD-MCNC: 164 MG/DL (ref 65–105)
GLUCOSE BLD-MCNC: 177 MG/DL (ref 65–105)
GLUCOSE SERPL-MCNC: 129 MG/DL (ref 70–99)
HBA1C MFR BLD: 6.2 % (ref 4–6)
HCT VFR BLD AUTO: 29.4 % (ref 36.3–47.1)
HGB BLD-MCNC: 9 G/DL (ref 11.9–15.1)
IMM GRANULOCYTES # BLD AUTO: 0.41 K/UL (ref 0–0.3)
IMM GRANULOCYTES NFR BLD: 4 %
LYMPHOCYTES NFR BLD: 1.65 K/UL (ref 1.1–3.7)
LYMPHOCYTES RELATIVE PERCENT: 16 % (ref 24–43)
MAGNESIUM SERPL-MCNC: 2 MG/DL (ref 1.6–2.6)
MCH RBC QN AUTO: 28 PG (ref 25.2–33.5)
MCHC RBC AUTO-ENTMCNC: 30.6 G/DL (ref 28.4–34.8)
MCV RBC AUTO: 91.3 FL (ref 82.6–102.9)
MONOCYTES NFR BLD: 0.8 K/UL (ref 0.1–1.2)
MONOCYTES NFR BLD: 8 % (ref 3–12)
NEUTROPHILS NFR BLD: 70 % (ref 36–65)
NEUTS SEG NFR BLD: 7.3 K/UL (ref 1.5–8.1)
NRBC BLD-RTO: 0 PER 100 WBC
PLATELET # BLD AUTO: 167 K/UL (ref 138–453)
PMV BLD AUTO: 11.5 FL (ref 8.1–13.5)
POTASSIUM SERPL-SCNC: 3.8 MMOL/L (ref 3.7–5.3)
RBC # BLD AUTO: 3.22 M/UL (ref 3.95–5.11)
RBC # BLD: ABNORMAL 10*6/UL
SODIUM SERPL-SCNC: 139 MMOL/L (ref 135–144)
WBC OTHER # BLD: 10.4 K/UL (ref 3.5–11.3)

## 2023-08-22 PROCEDURE — 2580000003 HC RX 258: Performed by: INTERNAL MEDICINE

## 2023-08-22 PROCEDURE — 99232 SBSQ HOSP IP/OBS MODERATE 35: CPT | Performed by: INTERNAL MEDICINE

## 2023-08-22 PROCEDURE — 6360000002 HC RX W HCPCS: Performed by: INTERNAL MEDICINE

## 2023-08-22 PROCEDURE — 80048 BASIC METABOLIC PNL TOTAL CA: CPT

## 2023-08-22 PROCEDURE — 6370000000 HC RX 637 (ALT 250 FOR IP): Performed by: INTERNAL MEDICINE

## 2023-08-22 PROCEDURE — 36415 COLL VENOUS BLD VENIPUNCTURE: CPT

## 2023-08-22 PROCEDURE — 83735 ASSAY OF MAGNESIUM: CPT

## 2023-08-22 PROCEDURE — 85025 COMPLETE CBC W/AUTO DIFF WBC: CPT

## 2023-08-22 PROCEDURE — 83036 HEMOGLOBIN GLYCOSYLATED A1C: CPT

## 2023-08-22 PROCEDURE — 82947 ASSAY GLUCOSE BLOOD QUANT: CPT

## 2023-08-22 RX ORDER — MIDODRINE HYDROCHLORIDE 10 MG/1
10 TABLET ORAL 3 TIMES DAILY PRN
Qty: 30 TABLET | Refills: 0 | Status: SHIPPED | OUTPATIENT
Start: 2023-08-22

## 2023-08-22 RX ADMIN — SODIUM CHLORIDE, PRESERVATIVE FREE 10 ML: 5 INJECTION INTRAVENOUS at 08:52

## 2023-08-22 RX ADMIN — LEVOTHYROXINE SODIUM 25 MCG: 25 TABLET ORAL at 08:51

## 2023-08-22 RX ADMIN — PIPERACILLIN AND TAZOBACTAM 3375 MG: 3; .375 INJECTION, POWDER, LYOPHILIZED, FOR SOLUTION INTRAVENOUS at 01:57

## 2023-08-22 RX ADMIN — ASPIRIN 81 MG: 81 TABLET, COATED ORAL at 08:51

## 2023-08-22 RX ADMIN — SODIUM CHLORIDE: 9 INJECTION, SOLUTION INTRAVENOUS at 09:59

## 2023-08-22 RX ADMIN — CEFTRIAXONE SODIUM 1000 MG: 1 INJECTION, POWDER, FOR SOLUTION INTRAMUSCULAR; INTRAVENOUS at 10:00

## 2023-08-22 ASSESSMENT — ENCOUNTER SYMPTOMS
RESPIRATORY NEGATIVE: 1
GASTROINTESTINAL NEGATIVE: 1

## 2023-08-22 NOTE — CARE COORDINATION
Discharge planning    Chart reviewed. Per ID notes:    Impression :   E coli septicemia     Recommendations: It is currently on Zosyn and the patient has a pan susceptible E. coli therefore I will narrow the antimicrobial therapy to Rocephin  Urology input noted and the plan is for a KUB to further assess the staghorn calculus      Patient is still on zosyn. And will discuss with RN.     Destiny Lacey with ID. Will change over to rocephin. Will need 2 weeks of iv atb on discharge. Sent rx to [facility] to run benefits. Discussed with patient and freedom of choice given for home care. Referrals made to [facility] and [facility] are unable to accept. [facility] unable to accept. Referral to  and [facility]. [facility] did state patient is eligible for their nursing services. Cost for medication would be 158 per week. Patient is aware and agreeable. Discussed with patient and she is having her daughter come in later today to discuss. She would be one to administer the medications. If no therapy is wanted than appropriate for using [facility] services. Await daughter Leonor Jo ([phone])    Patient will need midline today. 200  Spouse and daughter here and they went thru the education of the atb.family is declining any therapy. Withdrew the referral to .      [facility] will follow for the nursing aspect

## 2023-08-22 NOTE — DISCHARGE SUMMARY
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 coli bacteremia  Left adrenal mass  Toxic metabolic encephalopathy  Diabetes type 2  Leukocytosis      Primary Problem  Acute kidney injury [facility])    Hospital Course: Berkley Sarabia is a 77 y.o.  female who presents with abdominal status, decreased level of consciousness, EMS checked her blood sugar and was found to be significantly low, she received D50 and she was brought to the hospital, blood work in the ER showed acute renal failure with creatinine of 3.8, white blood cells 13.9. Patient admitted for further management. During hospital stay her oral hypoglycemic medications including Amaryl and metformin were held. Patient received dextrose which improved her blood glucose. She remain off of her diabetic medications during hospital stay. Hemoglobin A1c was 6.2. At the time of discharge patient suggested to maintain a close log of her blood glucose at home to bring it to her PCP to determine if she needs to be initiated on her oral hypoglycemic medications.     Retroperitoneal ultrasound showed multiple stones in the right kidney, x-ray of the abdomen showed intrarenal calculi, the biggest is measuring 1.3 cm  Urology consulted  KUB from 8/21/2023---Overall unchanged calcifications overlying the region of the right kidney   including the staghorn calculus in

## 2023-08-22 NOTE — CARE COORDINATION
Discharge planning    On discharge please fax over [address] [facility] to [facility].      Discharging to Facility/ Agency   Name: [facility]  Phone: [phone]  Fax: [phone]

## 2023-08-23 ENCOUNTER — HOSPITAL ENCOUNTER (INPATIENT)
Age: 78
LOS: 3 days | Discharge: HOME HEALTH | End: 2023-08-26
Attending: EMERGENCY MEDICINE | Admitting: HOSPITALIST
Payer: MEDICARE

## 2023-08-23 ENCOUNTER — APPOINTMENT (OUTPATIENT)
Dept: GENERAL RADIOLOGY | Age: 78
End: 2023-08-23
Payer: MEDICARE

## 2023-08-23 ENCOUNTER — APPOINTMENT (OUTPATIENT)
Dept: CT IMAGING | Age: 78
End: 2023-08-23
Payer: MEDICARE

## 2023-08-23 DIAGNOSIS — R27.0 ATAXIA: ICD-10-CM

## 2023-08-23 DIAGNOSIS — J18.9 PNEUMONIA DUE TO INFECTIOUS ORGANISM, UNSPECIFIED LATERALITY, UNSPECIFIED PART OF LUNG: Primary | ICD-10-CM

## 2023-08-23 DIAGNOSIS — R78.81 BACTEREMIA: ICD-10-CM

## 2023-08-23 PROBLEM — A41.9 SEPSIS (HCC): Status: ACTIVE | Noted: 2023-08-23

## 2023-08-23 LAB
ALBUMIN SERPL-MCNC: 2.7 G/DL (ref 3.5–5.2)
ALLEN TEST: POSITIVE
ALP SERPL-CCNC: 128 U/L (ref 35–104)
ALT SERPL-CCNC: 23 U/L (ref 5–33)
ANION GAP SERPL CALCULATED.3IONS-SCNC: 11 MMOL/L (ref 9–17)
AST SERPL-CCNC: 16 U/L
BACTERIA URNS QL MICRO: ABNORMAL
BASOPHILS # BLD: 0 K/UL (ref 0–0.2)
BASOPHILS NFR BLD: 0 %
BILIRUB SERPL-MCNC: 0.3 MG/DL (ref 0.3–1.2)
BILIRUB UR QL STRIP: NEGATIVE
BNP SERPL-MCNC: 8367 PG/ML
BUN SERPL-MCNC: 20 MG/DL (ref 8–23)
BUN/CREAT SERPL: 20 (ref 9–20)
CALCIUM SERPL-MCNC: 8.8 MG/DL (ref 8.6–10.4)
CHLORIDE SERPL-SCNC: 105 MMOL/L (ref 98–107)
CHP ED QC CHECK: YES
CLARITY UR: ABNORMAL
CO2 SERPL-SCNC: 23 MMOL/L (ref 20–31)
COLOR UR: YELLOW
CREAT SERPL-MCNC: 1 MG/DL (ref 0.5–0.9)
ECHO AO ROOT DIAM: 2.7 CM
ECHO AO ROOT INDEX: 1.55 CM/M2
ECHO AV AREA PEAK VELOCITY: 1.8 CM2
ECHO AV AREA VTI: 2 CM2
ECHO AV AREA/BSA PEAK VELOCITY: 1 CM2/M2
ECHO AV AREA/BSA VTI: 1.1 CM2/M2
ECHO AV MEAN GRADIENT: 9 MMHG
ECHO AV MEAN VELOCITY: 1.4 M/S
ECHO AV PEAK GRADIENT: 16 MMHG
ECHO AV PEAK VELOCITY: 2 M/S
ECHO AV VELOCITY RATIO: 0.65
ECHO AV VTI: 38.4 CM
ECHO BSA: 1.67 M2
ECHO EST RA PRESSURE: 3 MMHG
ECHO LA AREA 4C: 20.1 CM2
ECHO LA DIAMETER INDEX: 2.01 CM/M2
ECHO LA DIAMETER: 3.5 CM
ECHO LA MAJOR AXIS: 5.9 CM
ECHO LA TO AORTIC ROOT RATIO: 1.3
ECHO LA VOL 4C: 55 ML (ref 22–52)
ECHO LA VOLUME INDEX A4C: 32 ML/M2 (ref 16–34)
ECHO LV E' LATERAL VELOCITY: 8 CM/S
ECHO LV E' SEPTAL VELOCITY: 8 CM/S
ECHO LV FRACTIONAL SHORTENING: 37 % (ref 28–44)
ECHO LV INTERNAL DIMENSION DIASTOLE INDEX: 2.47 CM/M2
ECHO LV INTERNAL DIMENSION DIASTOLIC: 4.3 CM (ref 3.9–5.3)
ECHO LV INTERNAL DIMENSION SYSTOLIC INDEX: 1.55 CM/M2
ECHO LV INTERNAL DIMENSION SYSTOLIC: 2.7 CM
ECHO LV IVSD: 0.9 CM (ref 0.6–0.9)
ECHO LV MASS 2D: 132.7 G (ref 67–162)
ECHO LV MASS INDEX 2D: 76.3 G/M2 (ref 43–95)
ECHO LV POSTERIOR WALL DIASTOLIC: 1 CM (ref 0.6–0.9)
ECHO LV RELATIVE WALL THICKNESS RATIO: 0.47
ECHO LVOT AREA: 2.8 CM2
ECHO LVOT AV VTI INDEX: 0.71
ECHO LVOT DIAM: 1.9 CM
ECHO LVOT MEAN GRADIENT: 4 MMHG
ECHO LVOT PEAK GRADIENT: 7 MMHG
ECHO LVOT PEAK VELOCITY: 1.3 M/S
ECHO LVOT STROKE VOLUME INDEX: 44.3 ML/M2
ECHO LVOT SV: 77.1 ML
ECHO LVOT VTI: 27.2 CM
ECHO MV A VELOCITY: 1.47 M/S
ECHO MV E DECELERATION TIME (DT): 204 MS
ECHO MV E VELOCITY: 0.97 M/S
ECHO MV E/A RATIO: 0.66
ECHO MV E/E' LATERAL: 12.13
ECHO MV E/E' RATIO (AVERAGED): 12.13
ECHO MV E/E' SEPTAL: 12.13
ECHO RIGHT VENTRICULAR SYSTOLIC PRESSURE (RVSP): 24 MMHG
ECHO TV REGURGITANT MAX VELOCITY: 2.29 M/S
ECHO TV REGURGITANT PEAK GRADIENT: 21 MMHG
EOSINOPHIL # BLD: 0 K/UL (ref 0–0.4)
EOSINOPHILS RELATIVE PERCENT: 0 % (ref 1–4)
EPI CELLS #/AREA URNS HPF: ABNORMAL /HPF (ref 0–5)
ERYTHROCYTE [DISTWIDTH] IN BLOOD BY AUTOMATED COUNT: 16.5 % (ref 11.8–14.4)
FIO2: 21
FLUAV RNA RESP QL NAA+PROBE: NOT DETECTED
FLUBV RNA RESP QL NAA+PROBE: NOT DETECTED
GFR SERPL CREATININE-BSD FRML MDRD: 57 ML/MIN/1.73M2
GLUCOSE BLD-MCNC: 141 MG/DL
GLUCOSE BLD-MCNC: 141 MG/DL (ref 65–105)
GLUCOSE SERPL-MCNC: 133 MG/DL (ref 70–99)
GLUCOSE UR STRIP-MCNC: NEGATIVE MG/DL
HCT VFR BLD AUTO: 30.5 % (ref 36.3–47.1)
HGB BLD-MCNC: 9.4 G/DL (ref 11.9–15.1)
HGB UR QL STRIP.AUTO: ABNORMAL
IMM GRANULOCYTES # BLD AUTO: 0 K/UL (ref 0–0.3)
IMM GRANULOCYTES NFR BLD: 0 %
KETONES UR STRIP-MCNC: NEGATIVE MG/DL
LACTATE BLDV-SCNC: 1.5 MMOL/L (ref 0.5–1.9)
LACTATE BLDV-SCNC: 1.6 MMOL/L (ref 0.5–2.2)
LEUKOCYTE ESTERASE UR QL STRIP: ABNORMAL
LYMPHOCYTES NFR BLD: 1.76 K/UL (ref 1–4.8)
LYMPHOCYTES RELATIVE PERCENT: 6 % (ref 24–44)
MAGNESIUM SERPL-MCNC: 1.7 MG/DL (ref 1.6–2.6)
MCH RBC QN AUTO: 28.1 PG (ref 25.2–33.5)
MCHC RBC AUTO-ENTMCNC: 30.8 G/DL (ref 28.4–34.8)
MCV RBC AUTO: 91 FL (ref 82.6–102.9)
MONOCYTES NFR BLD: 0.59 K/UL (ref 0.2–0.8)
MONOCYTES NFR BLD: 2 % (ref 1–7)
MORPHOLOGY: ABNORMAL
NEGATIVE BASE EXCESS, ART: 0.5 MMOL/L (ref 0–2)
NEUTROPHILS NFR BLD: 92 % (ref 36–66)
NEUTS SEG NFR BLD: 27.05 K/UL (ref 1.8–7.7)
NITRITE UR QL STRIP: NEGATIVE
NRBC BLD-RTO: 0 PER 100 WBC
O2 DELIVERY DEVICE: ABNORMAL
P E INTERPRETATION, U: NORMAL
PATHOLOGIST: NORMAL
PH UR STRIP: 5.5 [PH] (ref 5–8)
PLATELET # BLD AUTO: 284 K/UL (ref 138–453)
PMV BLD AUTO: 11.1 FL (ref 8.1–13.5)
POC HCO3: 23.1 MMOL/L (ref 21–28)
POC O2 SATURATION: 95 % (ref 94–98)
POC PCO2: 32.7 MM HG (ref 35–48)
POC PH: 7.46 (ref 7.35–7.45)
POC PO2: 70.5 MM HG (ref 83–108)
POTASSIUM SERPL-SCNC: 3.6 MMOL/L (ref 3.7–5.3)
PROT SERPL-MCNC: 6.3 G/DL (ref 6.4–8.3)
PROT UR STRIP-MCNC: ABNORMAL MG/DL
RBC # BLD AUTO: 3.35 M/UL (ref 3.95–5.11)
RBC # BLD: ABNORMAL 10*6/UL
RBC #/AREA URNS HPF: ABNORMAL /HPF (ref 0–2)
SAMPLE SITE: ABNORMAL
SARS-COV-2 RNA RESP QL NAA+PROBE: NOT DETECTED
SODIUM SERPL-SCNC: 139 MMOL/L (ref 135–144)
SOURCE: NORMAL
SP GR UR STRIP: 1.02 (ref 1–1.03)
SPECIMEN DESCRIPTION: NORMAL
SPECIMEN TYPE: NORMAL
SPECIMEN TYPE: NORMAL
TROPONIN I SERPL HS-MCNC: 29 NG/L (ref 0–14)
URINE IFX INTERP: NORMAL
URINE TOTAL PROTEIN: 46 MG/DL
URINE TOTAL PROTEIN: 46 MG/DL
UROBILINOGEN UR STRIP-ACNC: NORMAL EU/DL (ref 0–1)
WBC #/AREA URNS HPF: ABNORMAL /HPF (ref 0–5)
WBC OTHER # BLD: 29.4 K/UL (ref 3.5–11.3)

## 2023-08-23 PROCEDURE — 71045 X-RAY EXAM CHEST 1 VIEW: CPT

## 2023-08-23 PROCEDURE — 82803 BLOOD GASES ANY COMBINATION: CPT

## 2023-08-23 PROCEDURE — 96360 HYDRATION IV INFUSION INIT: CPT

## 2023-08-23 PROCEDURE — 85025 COMPLETE CBC W/AUTO DIFF WBC: CPT

## 2023-08-23 PROCEDURE — 71250 CT THORAX DX C-: CPT

## 2023-08-23 PROCEDURE — 36415 COLL VENOUS BLD VENIPUNCTURE: CPT

## 2023-08-23 PROCEDURE — 82947 ASSAY GLUCOSE BLOOD QUANT: CPT

## 2023-08-23 PROCEDURE — 2580000003 HC RX 258: Performed by: HOSPITALIST

## 2023-08-23 PROCEDURE — 83605 ASSAY OF LACTIC ACID: CPT

## 2023-08-23 PROCEDURE — 6360000002 HC RX W HCPCS: Performed by: HOSPITALIST

## 2023-08-23 PROCEDURE — 83735 ASSAY OF MAGNESIUM: CPT

## 2023-08-23 PROCEDURE — 36600 WITHDRAWAL OF ARTERIAL BLOOD: CPT

## 2023-08-23 PROCEDURE — 2580000003 HC RX 258: Performed by: EMERGENCY MEDICINE

## 2023-08-23 PROCEDURE — 6370000000 HC RX 637 (ALT 250 FOR IP): Performed by: HOSPITALIST

## 2023-08-23 PROCEDURE — 99285 EMERGENCY DEPT VISIT HI MDM: CPT

## 2023-08-23 PROCEDURE — 2060000000 HC ICU INTERMEDIATE R&B

## 2023-08-23 PROCEDURE — 96361 HYDRATE IV INFUSION ADD-ON: CPT

## 2023-08-23 PROCEDURE — 87636 SARSCOV2 & INF A&B AMP PRB: CPT

## 2023-08-23 PROCEDURE — 83880 ASSAY OF NATRIURETIC PEPTIDE: CPT

## 2023-08-23 PROCEDURE — 81001 URINALYSIS AUTO W/SCOPE: CPT

## 2023-08-23 PROCEDURE — 84145 PROCALCITONIN (PCT): CPT

## 2023-08-23 PROCEDURE — 93005 ELECTROCARDIOGRAM TRACING: CPT | Performed by: EMERGENCY MEDICINE

## 2023-08-23 PROCEDURE — 84484 ASSAY OF TROPONIN QUANT: CPT

## 2023-08-23 PROCEDURE — 80053 COMPREHEN METABOLIC PANEL: CPT

## 2023-08-23 PROCEDURE — 87040 BLOOD CULTURE FOR BACTERIA: CPT

## 2023-08-23 RX ORDER — POTASSIUM CHLORIDE 20 MEQ/1
40 TABLET, EXTENDED RELEASE ORAL PRN
Status: DISCONTINUED | OUTPATIENT
Start: 2023-08-23 | End: 2023-08-26 | Stop reason: HOSPADM

## 2023-08-23 RX ORDER — ONDANSETRON 2 MG/ML
4 INJECTION INTRAMUSCULAR; INTRAVENOUS EVERY 6 HOURS PRN
Status: DISCONTINUED | OUTPATIENT
Start: 2023-08-23 | End: 2023-08-26 | Stop reason: HOSPADM

## 2023-08-23 RX ORDER — ATORVASTATIN CALCIUM 20 MG/1
20 TABLET, FILM COATED ORAL NIGHTLY
Status: DISCONTINUED | OUTPATIENT
Start: 2023-08-23 | End: 2023-08-26 | Stop reason: HOSPADM

## 2023-08-23 RX ORDER — SODIUM CHLORIDE 0.9 % (FLUSH) 0.9 %
10 SYRINGE (ML) INJECTION EVERY 12 HOURS SCHEDULED
Status: DISCONTINUED | OUTPATIENT
Start: 2023-08-23 | End: 2023-08-26 | Stop reason: HOSPADM

## 2023-08-23 RX ORDER — SODIUM CHLORIDE 9 MG/ML
INJECTION, SOLUTION INTRAVENOUS CONTINUOUS
Status: DISCONTINUED | OUTPATIENT
Start: 2023-08-23 | End: 2023-08-23

## 2023-08-23 RX ORDER — SODIUM CHLORIDE 0.9 % (FLUSH) 0.9 %
10 SYRINGE (ML) INJECTION PRN
Status: DISCONTINUED | OUTPATIENT
Start: 2023-08-23 | End: 2023-08-26 | Stop reason: HOSPADM

## 2023-08-23 RX ORDER — ACETAMINOPHEN 650 MG/1
650 SUPPOSITORY RECTAL EVERY 6 HOURS PRN
Status: DISCONTINUED | OUTPATIENT
Start: 2023-08-23 | End: 2023-08-26 | Stop reason: HOSPADM

## 2023-08-23 RX ORDER — MIDODRINE HYDROCHLORIDE 10 MG/1
10 TABLET ORAL 3 TIMES DAILY PRN
Status: DISCONTINUED | OUTPATIENT
Start: 2023-08-23 | End: 2023-08-26 | Stop reason: HOSPADM

## 2023-08-23 RX ORDER — SENNOSIDES A AND B 8.6 MG/1
1 TABLET, FILM COATED ORAL 2 TIMES DAILY PRN
Status: DISCONTINUED | OUTPATIENT
Start: 2023-08-23 | End: 2023-08-26 | Stop reason: HOSPADM

## 2023-08-23 RX ORDER — ACETAMINOPHEN 325 MG/1
650 TABLET ORAL EVERY 6 HOURS PRN
Status: DISCONTINUED | OUTPATIENT
Start: 2023-08-23 | End: 2023-08-26 | Stop reason: HOSPADM

## 2023-08-23 RX ORDER — LEVOTHYROXINE SODIUM 0.03 MG/1
25 TABLET ORAL EVERY MORNING
Status: DISCONTINUED | OUTPATIENT
Start: 2023-08-24 | End: 2023-08-26 | Stop reason: HOSPADM

## 2023-08-23 RX ORDER — FUROSEMIDE 10 MG/ML
40 INJECTION INTRAMUSCULAR; INTRAVENOUS DAILY
Status: DISCONTINUED | OUTPATIENT
Start: 2023-08-24 | End: 2023-08-26

## 2023-08-23 RX ORDER — SODIUM CHLORIDE 9 MG/ML
INJECTION, SOLUTION INTRAVENOUS CONTINUOUS
Status: DISCONTINUED | OUTPATIENT
Start: 2023-08-23 | End: 2023-08-24

## 2023-08-23 RX ORDER — ENOXAPARIN SODIUM 100 MG/ML
40 INJECTION SUBCUTANEOUS DAILY
Status: DISCONTINUED | OUTPATIENT
Start: 2023-08-24 | End: 2023-08-26 | Stop reason: HOSPADM

## 2023-08-23 RX ORDER — SODIUM CHLORIDE 9 MG/ML
INJECTION, SOLUTION INTRAVENOUS PRN
Status: DISCONTINUED | OUTPATIENT
Start: 2023-08-23 | End: 2023-08-26 | Stop reason: HOSPADM

## 2023-08-23 RX ORDER — MAGNESIUM SULFATE 1 G/100ML
1000 INJECTION INTRAVENOUS PRN
Status: DISCONTINUED | OUTPATIENT
Start: 2023-08-23 | End: 2023-08-26 | Stop reason: HOSPADM

## 2023-08-23 RX ORDER — ONDANSETRON 4 MG/1
4 TABLET, ORALLY DISINTEGRATING ORAL EVERY 8 HOURS PRN
Status: DISCONTINUED | OUTPATIENT
Start: 2023-08-23 | End: 2023-08-26 | Stop reason: HOSPADM

## 2023-08-23 RX ORDER — 0.9 % SODIUM CHLORIDE 0.9 %
1000 INTRAVENOUS SOLUTION INTRAVENOUS ONCE
Status: COMPLETED | OUTPATIENT
Start: 2023-08-23 | End: 2023-08-23

## 2023-08-23 RX ORDER — ASPIRIN 81 MG/1
81 TABLET ORAL DAILY
Status: DISCONTINUED | OUTPATIENT
Start: 2023-08-24 | End: 2023-08-26 | Stop reason: HOSPADM

## 2023-08-23 RX ORDER — MIDODRINE HYDROCHLORIDE 5 MG/1
5 TABLET ORAL
Status: DISCONTINUED | OUTPATIENT
Start: 2023-08-23 | End: 2023-08-26 | Stop reason: HOSPADM

## 2023-08-23 RX ORDER — POTASSIUM CHLORIDE 7.45 MG/ML
10 INJECTION INTRAVENOUS PRN
Status: DISCONTINUED | OUTPATIENT
Start: 2023-08-23 | End: 2023-08-26 | Stop reason: HOSPADM

## 2023-08-23 RX ADMIN — SODIUM CHLORIDE: 9 INJECTION, SOLUTION INTRAVENOUS at 23:07

## 2023-08-23 RX ADMIN — MIDODRINE HYDROCHLORIDE 5 MG: 5 TABLET ORAL at 18:39

## 2023-08-23 RX ADMIN — SODIUM CHLORIDE 1000 ML: 9 INJECTION, SOLUTION INTRAVENOUS at 14:50

## 2023-08-23 RX ADMIN — SODIUM CHLORIDE, PRESERVATIVE FREE 10 ML: 5 INJECTION INTRAVENOUS at 22:49

## 2023-08-23 RX ADMIN — ATORVASTATIN CALCIUM 20 MG: 20 TABLET, FILM COATED ORAL at 23:06

## 2023-08-23 RX ADMIN — CEFTRIAXONE SODIUM 2000 MG: 2 INJECTION, POWDER, FOR SOLUTION INTRAMUSCULAR; INTRAVENOUS at 23:08

## 2023-08-23 ASSESSMENT — ENCOUNTER SYMPTOMS
BACK PAIN: 0
EYE PAIN: 0
DIARRHEA: 0
COUGH: 0
ABDOMINAL PAIN: 0
SHORTNESS OF BREATH: 0
SORE THROAT: 0
VOMITING: 0
EYE REDNESS: 0

## 2023-08-23 NOTE — ED NOTES
Px rounded on. Px doing well and states no questions or concerns.       Anamaria Velasco RN  08/23/23 3334

## 2023-08-23 NOTE — H&P
History & Physical  [facility].,    Adult Hospitalist      Name: Marguerite Lewis  MRN: [id number]     Acct: [de-identified]  Room: STA14/14    Admit Date: 8/23/2023  2:04 PM  PCP: Torito Smith MD    Primary Problem  Active Problems:    * No active hospital problems. *  Resolved Problems:    * No resolved hospital problems. *        Assesment:     Leukocytosis/  Pulmonary edema  Elevated troponin  Small pleural effusion  Recent E. 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 Continue medications as below      Scheduled Meds:   sodium chloride  1,000 mL IntraVENous Once     Continuous Infusions:    PRN Meds:       Chief Complaint:     Chief Complaint   Patient presents with    Blood Sugar Problem     Pt states her home health nurse sent her in for Low O2 (no SOB) and hypoglycemia. History of Present Illness: Marguerite Lewis is a 77 y.o.  female who presents with Blood Sugar Problem (Pt states her home health nurse sent her in for Low O2 (no SOB) and hypoglycemia. )    65-year-old lady with recent diagnosis of xanthogranulomatous pyelonephritis, E. coli sepsis, left adrenal mass, left breast mass, hypoglycemia discharge on 8/22/2023 presented again to ER due to shortness of breath and hypoglycemia. Patient was seen and evaluated by home health nurse.   She was not ABDOMEN (KUB) (SINGLE AP VIEW)    Result Date: 8/21/2023  Overall unchanged calcifications overlying the region of the right kidney including the staghorn calculus in the region of the lower pole. XR ABDOMEN (KUB) (SINGLE AP VIEW)    Result Date: 8/18/2023  1. Opacities overlying the right kidney which likely represent intrarenal calculi with the largest measuring 1.3 cm. 2. No evidence of bowel obstruction. CT HEAD WO CONTRAST    Result Date: 8/18/2023  No acute intracranial abnormality. US RENAL COMPLETE    Result Date: 8/18/2023  1. The right kidney contains multiple stones measuring up to 1.3 cm without definite hydronephrosis. 2. The right kidney contains either complicated renal sinus cysts or prominent pyramids containing debris. Consider CT for further evaluation. 3. Unremarkable left kidney and bladder. XR CHEST PORTABLE    Result Date: 8/23/2023  Findings compatible with developing pulmonary edema with small pleural effusions. XR CHEST PORTABLE    Result Date: 8/18/2023  [ 1. Enlargement of cardiac silhouette with cephalization suggesting a CHF exacerbation with pulmonary venous congestion. 2. Minimal right lung base opacity likely representing subsegmental atelectasis although aspiration and a small pneumonia are in the differential diagnosis. All radiological studies reviewed                Code Status:  Prior    Electronically signed by Kim Davis MD on 8/23/2023 at 4:37 PM     Copy sent to Dr. Jaja Summers MD    This note was created with the assistance of a speech-recognition program.  Although the intention is to generate a document that actually reflects the content of the visit, no guarantees can be provided that every mistake has been identified and corrected by editing. Note was updated later by me after  physical examination and  completion of the assessment.

## 2023-08-23 NOTE — ED NOTES
Px given box lunch. Px updated on status of care and expresses no questions or concerns.       Candance Heckle, RN  08/23/23 3063

## 2023-08-23 NOTE — ED NOTES
Pt arrives to the ED for c/I intermittent drops in blood glucose  Pt was seen and admitted here on 8/18 for continued drop in blood sugars  Pt alert and oriented x 4  RR is even and non-labored, NAD noticed at this time     Linda Espinal, JOANA  08/23/23 4819

## 2023-08-24 LAB
ANION GAP SERPL CALCULATED.3IONS-SCNC: 9 MMOL/L (ref 9–17)
BASOPHILS # BLD: 0.05 K/UL (ref 0–0.2)
BASOPHILS NFR BLD: 0 % (ref 0–2)
BNP SERPL-MCNC: 5816 PG/ML
BUN SERPL-MCNC: 17 MG/DL (ref 8–23)
BUN/CREAT SERPL: 21 (ref 9–20)
CALCIUM SERPL-MCNC: 8.5 MG/DL (ref 8.6–10.4)
CHLORIDE SERPL-SCNC: 111 MMOL/L (ref 98–107)
CO2 SERPL-SCNC: 23 MMOL/L (ref 20–31)
CREAT SERPL-MCNC: 0.8 MG/DL (ref 0.5–0.9)
EOSINOPHIL # BLD: 0.15 K/UL (ref 0–0.44)
EOSINOPHILS RELATIVE PERCENT: 1 % (ref 1–4)
ERYTHROCYTE [DISTWIDTH] IN BLOOD BY AUTOMATED COUNT: 16.7 % (ref 11.8–14.4)
GFR SERPL CREATININE-BSD FRML MDRD: >60 ML/MIN/1.73M2
GLUCOSE BLD-MCNC: 131 MG/DL (ref 65–105)
GLUCOSE BLD-MCNC: 133 MG/DL (ref 65–105)
GLUCOSE BLD-MCNC: 141 MG/DL (ref 65–105)
GLUCOSE BLD-MCNC: 154 MG/DL (ref 65–105)
GLUCOSE SERPL-MCNC: 125 MG/DL (ref 70–99)
HCT VFR BLD AUTO: 26.9 % (ref 36.3–47.1)
HGB BLD-MCNC: 8.1 G/DL (ref 11.9–15.1)
IMM GRANULOCYTES # BLD AUTO: 0.19 K/UL (ref 0–0.3)
IMM GRANULOCYTES NFR BLD: 1 %
LACTATE BLDV-SCNC: 1.1 MMOL/L (ref 0.5–1.9)
LYMPHOCYTES NFR BLD: 1.9 K/UL (ref 1.1–3.7)
LYMPHOCYTES RELATIVE PERCENT: 10 % (ref 24–43)
MCH RBC QN AUTO: 28.2 PG (ref 25.2–33.5)
MCHC RBC AUTO-ENTMCNC: 30.1 G/DL (ref 28.4–34.8)
MCV RBC AUTO: 93.7 FL (ref 82.6–102.9)
MONOCYTES NFR BLD: 0.86 K/UL (ref 0.1–1.2)
MONOCYTES NFR BLD: 4 % (ref 3–12)
NEUTROPHILS NFR BLD: 84 % (ref 36–65)
NEUTS SEG NFR BLD: 16.24 K/UL (ref 1.5–8.1)
PLATELET # BLD AUTO: 256 K/UL (ref 138–453)
POTASSIUM SERPL-SCNC: 3.7 MMOL/L (ref 3.7–5.3)
PROCALCITONIN SERPL-MCNC: 2.41 NG/ML
RBC # BLD AUTO: 2.87 M/UL (ref 3.95–5.11)
RBC # BLD: ABNORMAL 10*6/UL
REASON FOR REJECTION: NORMAL
SODIUM SERPL-SCNC: 143 MMOL/L (ref 135–144)
SPECIMEN SOURCE: NORMAL
TROPONIN I SERPL HS-MCNC: 15 NG/L (ref 0–14)
TROPONIN I SERPL HS-MCNC: 16 NG/L (ref 0–14)
TROPONIN I SERPL HS-MCNC: 16 NG/L (ref 0–14)
WBC OTHER # BLD: 19.4 K/UL (ref 3.5–11.3)
ZZ NTE CLEAN UP: ORDERED TEST: NORMAL

## 2023-08-24 PROCEDURE — 97116 GAIT TRAINING THERAPY: CPT

## 2023-08-24 PROCEDURE — 97530 THERAPEUTIC ACTIVITIES: CPT

## 2023-08-24 PROCEDURE — 97162 PT EVAL MOD COMPLEX 30 MIN: CPT

## 2023-08-24 PROCEDURE — 99222 1ST HOSP IP/OBS MODERATE 55: CPT | Performed by: NURSE PRACTITIONER

## 2023-08-24 PROCEDURE — 80048 BASIC METABOLIC PNL TOTAL CA: CPT

## 2023-08-24 PROCEDURE — 6370000000 HC RX 637 (ALT 250 FOR IP): Performed by: HOSPITALIST

## 2023-08-24 PROCEDURE — 2060000000 HC ICU INTERMEDIATE R&B

## 2023-08-24 PROCEDURE — 84484 ASSAY OF TROPONIN QUANT: CPT

## 2023-08-24 PROCEDURE — 2580000003 HC RX 258: Performed by: NURSE PRACTITIONER

## 2023-08-24 PROCEDURE — 2580000003 HC RX 258: Performed by: HOSPITALIST

## 2023-08-24 PROCEDURE — 83605 ASSAY OF LACTIC ACID: CPT

## 2023-08-24 PROCEDURE — 36415 COLL VENOUS BLD VENIPUNCTURE: CPT

## 2023-08-24 PROCEDURE — 83880 ASSAY OF NATRIURETIC PEPTIDE: CPT

## 2023-08-24 PROCEDURE — 97535 SELF CARE MNGMENT TRAINING: CPT

## 2023-08-24 PROCEDURE — 6360000002 HC RX W HCPCS: Performed by: HOSPITALIST

## 2023-08-24 PROCEDURE — 97166 OT EVAL MOD COMPLEX 45 MIN: CPT

## 2023-08-24 PROCEDURE — 85025 COMPLETE CBC W/AUTO DIFF WBC: CPT

## 2023-08-24 PROCEDURE — 82947 ASSAY GLUCOSE BLOOD QUANT: CPT

## 2023-08-24 RX ORDER — PANTOPRAZOLE SODIUM 40 MG/1
40 TABLET, DELAYED RELEASE ORAL
Status: DISCONTINUED | OUTPATIENT
Start: 2023-08-25 | End: 2023-08-26 | Stop reason: HOSPADM

## 2023-08-24 RX ADMIN — MIDODRINE HYDROCHLORIDE 5 MG: 5 TABLET ORAL at 17:52

## 2023-08-24 RX ADMIN — ENOXAPARIN SODIUM 40 MG: 100 INJECTION SUBCUTANEOUS at 08:37

## 2023-08-24 RX ADMIN — CEFTRIAXONE SODIUM 2000 MG: 2 INJECTION, POWDER, FOR SOLUTION INTRAMUSCULAR; INTRAVENOUS at 22:38

## 2023-08-24 RX ADMIN — MIDODRINE HYDROCHLORIDE 5 MG: 5 TABLET ORAL at 13:16

## 2023-08-24 RX ADMIN — SODIUM CHLORIDE: 9 INJECTION, SOLUTION INTRAVENOUS at 00:14

## 2023-08-24 RX ADMIN — LEVOTHYROXINE SODIUM 25 MCG: 25 TABLET ORAL at 08:37

## 2023-08-24 RX ADMIN — ASPIRIN 81 MG: 81 TABLET, COATED ORAL at 08:37

## 2023-08-24 RX ADMIN — FUROSEMIDE 40 MG: 10 INJECTION, SOLUTION INTRAMUSCULAR; INTRAVENOUS at 08:37

## 2023-08-24 RX ADMIN — ATORVASTATIN CALCIUM 20 MG: 20 TABLET, FILM COATED ORAL at 20:27

## 2023-08-24 RX ADMIN — SODIUM CHLORIDE, PRESERVATIVE FREE 10 ML: 5 INJECTION INTRAVENOUS at 08:46

## 2023-08-24 RX ADMIN — SODIUM CHLORIDE, PRESERVATIVE FREE 10 ML: 5 INJECTION INTRAVENOUS at 20:27

## 2023-08-24 RX ADMIN — MIDODRINE HYDROCHLORIDE 5 MG: 5 TABLET ORAL at 08:37

## 2023-08-24 NOTE — PLAN OF CARE
Problem: Discharge Planning  Goal: Discharge to home or other facility with appropriate resources  8/24/2023 1046 by Valentina Johnson RN  Outcome: Progressing  Flowsheets (Taken 8/24/2023 0800)  Discharge to home or other facility with appropriate resources:   Identify barriers to discharge with patient and caregiver   Arrange for needed discharge resources and transportation as appropriate   Identify discharge learning needs (meds, wound care, etc)   Arrange for interpreters to assist at discharge as needed   Refer to discharge planning if patient needs post-hospital services based on physician order or complex needs related to functional status, cognitive ability or social support system  8/24/2023 0123 by Vladimir Tolbert RN  Outcome: Progressing     Problem: Safety - Adult  Goal: Free from fall injury  8/24/2023 1046 by Valentina Johnson RN  Outcome: Progressing  8/24/2023 0123 by Vladimir Tolbert RN  Outcome: Progressing     Problem: ABCDS Injury Assessment  Goal: Absence of physical injury  8/24/2023 1046 by Valentina Johnson RN  Outcome: Progressing  8/24/2023 0123 by Vladimir Tolbert RN  Outcome: Progressing     Problem: Skin/Tissue Integrity  Goal: Absence of new skin breakdown  Description: 1. Monitor for areas of redness and/or skin breakdown  2. Assess vascular access sites hourly  3. Every 4-6 hours minimum:  Change oxygen saturation probe site  4. Every 4-6 hours:  If on nasal continuous positive airway pressure, respiratory therapy assess nares and determine need for appliance change or resting period.   8/24/2023 1046 by Valentina Johnson RN  Outcome: Progressing  8/24/2023 0123 by Vladimir Tolbert RN  Outcome: Progressing     Problem: Respiratory - Adult  Goal: Achieves optimal ventilation and oxygenation  8/24/2023 1046 by Valentina Johnson RN  Outcome: Progressing  Flowsheets (Taken 8/24/2023 0800)  Achieves optimal ventilation and oxygenation:   Assess for changes in respiratory status   Assess for changes in replacements, including repeat lab results as appropriate   Instruct patient on fluid and nutrition restrictions as appropriate  8/24/2023 0123 by Quan Mortensen RN  Outcome: Progressing

## 2023-08-24 NOTE — PROGRESS NOTES
Physical Therapy  Facility/Department: [facility] PROGRESSIVE CARE  Physical Therapy Initial Assessment    Name: Latia Vasquez  : 1945  MRN: [id number]  Date of Service: 2023    Discharge Recommendations:  Patient would benefit from continued therapy after discharge. Due to recent hospitalization and medical condition, pt would benefit from additional intermittent skilled therapy at time of discharge. Please refer to the AM-PAC score for current functional status. HPI (per chart): Latia Vasquez is 77 y.o.  female who presented to the emergency room due to tachycardia and fever at home noted by home care RN. She also had SPO2 of 89%. She was discharged on 2023 for E. coli sepsis presumed from UTI. She has been on room air saturating in the 90s since admission. She denies any shortness of breath, cough or chest pain. She never smoked. No history of asthma or sleep apnea. She is currently lying comfortably in the bed, no distress. She is feeling better this morning than she did yesterday. Patient Diagnosis(es): The primary encounter diagnosis was Pneumonia due to infectious organism, unspecified laterality, unspecified part of lung. A diagnosis of Bacteremia was also pertinent to this visit. Past Medical History:  has no past medical history on file. Past Surgical History:  has no past surgical history on file. Assessment   Body Structures, Functions, Activity Limitations Requiring Skilled Therapeutic Intervention: Decreased functional mobility ; Decreased strength;Decreased safe awareness;Decreased endurance;Decreased balance;Decreased posture  Assessment: Patient reports she was previsouly indep prior to last admission. Patient currently min assist / CGA for bed moblity, transfers, and gait. Patient will benefit from skilled PT to improve strength, functional activity tolerance, bed mobility, gait, and transfers.   Specific Instructions for Next Treatment: standing balance, 4-/5, knee and ankle 4+/5           Bed mobility  Bridging: Minimal assistance  Rolling to Left: Minimal assistance  Rolling to Right: Minimal assistance  Supine to Sit: Minimal assistance  Bed Mobility Comments: Verbal cues for use of bed rail  Transfers  Sit to Stand: Contact guard assistance  Stand to Sit: Contact guard assistance  Bed to Chair: Contact guard assistance  Stand Pivot Transfers: Contact guard assistance  Comment: RW with transfers, verbal cues for proper habd placement with walker and to not push walker off to side before sitting. Ambulation  Surface: Level tile  Device: Rolling Walker  Assistance: Contact guard assistance  Quality of Gait: Patient educated to keep walker with her and not push it to side when approaching counter /sink/ toilet. Patient given verbal cues to not look down at her feet when walking (fair retention of education). Gait Deviations: Slow Felisha;Decreased step height;Decreased step length;Decreased head and trunk rotation  Distance: 60 feet     Balance  Sitting - Static: Good  Sitting - Dynamic: Good  Standing - Static: Fair;+ (RW)  Standing - Dynamic: Fair;+ (RW)           OutComes Score  Balance Score: 8 (08/24/23 1249)  Gait Score: 5 (08/24/23 1249)        Tinetti Total Score: 13 (08/24/23 1249)                                   AM-PAC Score  AM-PAC Inpatient Mobility Raw Score : 18 (08/24/23 1249)  AM-PAC Inpatient T-Scale Score : 43.63 (08/24/23 1249)  Mobility Inpatient CMS 0-100% Score: 46.58 (08/24/23 1249)  Mobility Inpatient CMS G-Code Modifier : CK (08/24/23 1249)          Tinneti Score  Balance Score: 8 (08/24/23 1249)  Gait Score: 5 (08/24/23 1249)  Tinetti Total Score: 13 (08/24/23 1249)  Tinetti Disability Index: 40-59% (08/24/23 1249)    Goals  Short Term Goals  Time Frame for Short Term Goals: 12 visits  Short Term Goal 1: Patient will be indep with bed mobility. Short Term Goal 2: Patient will be indep with transfers.   Short Term Goal 3: Patient will amb 200 feet indep with RW. Short Term Goal 4: Patient will be indep with home. Short Term Goal 5: Patient will have good standing balance. Patient Goals   Patient Goals : Improve incontinence / loose stool       Education  Patient Education  Education Given To: Patient  Education Provided: Role of Therapy;Plan of Care;Transfer Training; Fall Prevention Strategies  Education Method: Demonstration;Verbal  Barriers to Learning: None  Education Outcome: Verbalized understanding;Continued education needed      Therapy Time   Individual Concurrent Group Co-treatment   Time In 1135         Time Out 1215         Minutes 40         Timed Code Treatment Minutes: 25 Minutes     Additional time from [phone] to [phone] (23 minutes).  Total time 63 minutes (tx time 48 minutes)  Jeromy Christensen PT

## 2023-08-24 NOTE — PROGRESS NOTES
Patient received anointing by Father Gauri Bueno, and communion today from volunteer; 8/24/2023. Spiritual care will follow up as needed or requested. 08/24/23 0845   Encounter Summary   Encounter Overview/Reason   Encounter   Service Provided For: Patient   Referral/Consult From: Clergy/;Rounding   Last Encounter  08/24/23  (8/24/2023 Anointed [facility])   Complexity of Encounter Low   Rituals, Rites and Sacraments   Type Anointing

## 2023-08-24 NOTE — PROGRESS NOTES
Progress note  [facility].,    Adult Hospitalist      Name: Jodi Callaway  MRN: [id number]     Acct: [de-identified]  Room: [facility]/1012-02    Admit Date: 8/23/2023  2:04 PM  PCP: Debbie Hinojosa MD    Primary Problem  Principal Problem:    Sepsis [facility])  Resolved Problems:    * No resolved hospital problems. *        Assesment:     Leukocytosis/  Pulmonary edema  Elevated troponin  Small pleural effusion  Recent E. 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   Continue medications as below      Scheduled Meds:   aspirin  81 mg Oral Daily    levothyroxine  25 mcg Oral QAM    atorvastatin  20 mg Oral Nightly    sodium chloride flush  10 mL IntraVENous 2 times per day    enoxaparin  40 mg SubCUTAneous Daily    furosemide  40 mg IntraVENous Daily    midodrine  5 mg Oral TID WC    cefTRIAXone (ROCEPHIN) IV  2,000 mg IntraVENous Q24H     Continuous Infusions:   sodium chloride Stopped (08/23/23 6831)     PRN Meds:  midodrine, 10 mg, TID PRN  sodium chloride flush, 10 mL, PRN  sodium chloride, , PRN  potassium chloride, 40 mEq, PRN   Or  potassium alternative oral replacement, 40 mEq, PRN   Or  potassium chloride, 10 mEq, PRN  magnesium sulfate, 1,000 mg, PRN  ondansetron, 4 mg, Q8H PRN   Or  ondansetron, 4 mg, Q6H PRN  senna, 1 tablet, BID PRN  acetaminophen, 650 mg, Q6H PRN --   --   --   --    AST  --   --   --   --   --  16  --   --   --    ALT  --   --   --   --   --  23  --   --   --    ALKPHOS  --   --   --   --   --  128*  --   --   --    BILITOT  --   --   --   --   --  0.3  --   --   --    POCGLU  --    < > 177* 164* 141*  --  154* 131* 141*    < > = values in this interval not displayed. Lab Results   Component Value Date    INR 1.2 08/20/2023    PROTIME 15.1 (H) 08/20/2023       Lab Results   Component Value Date/Time    SPECIAL RFA 12 ML 08/23/2023 03:21 PM     Lab Results   Component Value Date/Time    CULTURE NO GROWTH 12 HOURS 08/23/2023 03:21 PM       Lab Results   Component Value Date/Time    POCPH 7.457 08/23/2023 11:21 PM    POCPCO2 32.7 08/23/2023 11:21 PM    POCPO2 70.5 08/23/2023 11:21 PM    POCHCO3 23.1 08/23/2023 11:21 PM    NBEA 0.5 08/23/2023 11:21 PM    LBZF3SEA 95.0 08/23/2023 11:21 PM    FIO2 21.0 08/23/2023 11:21 PM       Radiology:    CT ABDOMEN PELVIS WO CONTRAST Additional Contrast? None    Result Date: 8/19/2023  Moderate pericardial effusion. Left breast mass. Recommend correlation with recent mammography to exclude neoplasm. 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 Possible acute left colonic diverticulitis. No evidence of abscess or free air. Cholelithiasis. 2.5 cm left adrenal mass. Recommend nonemergent follow-up CT or MRI of the adrenals. XR ABDOMEN (KUB) (SINGLE AP VIEW)    Result Date: 8/21/2023  Overall unchanged calcifications overlying the region of the right kidney including the staghorn calculus in the region of the lower pole. XR ABDOMEN (KUB) (SINGLE AP VIEW)    Result Date: 8/18/2023  1. Opacities overlying the right kidney which likely represent intrarenal calculi with the largest measuring 1.3 cm. 2. No evidence of bowel obstruction. CT HEAD WO CONTRAST    Result Date: 8/18/2023  No acute intracranial abnormality.      US RENAL COMPLETE    Result

## 2023-08-24 NOTE — PLAN OF CARE
Pt is A&O x4 and has been resting in bed. Pt was admitted for hypoglycemia and sepsis. Pt has a single lumen midline for IV ABX at home. Pt is receiving Rocephin Q24 hours. Pt has NS going at 75 mL/hr. Pt is incontinent of urine and stool. Brief and purewick are in place. Pt has a mepliex on her coccyx for redness. Pt is a standby assist to ambulate. Pt had a CT of chest done overnight. Results pending. Blood cultures pending. Pt is a Q6 blood sugar check. VSS. Pt is on room air. All questions and concerns addressed. Bed locked in the lowest position and call light is in reach. Problem: Discharge Planning  Goal: Discharge to home or other facility with appropriate resources  Outcome: Progressing     Problem: Safety - Adult  Goal: Free from fall injury  Outcome: Progressing     Problem: ABCDS Injury Assessment  Goal: Absence of physical injury  Outcome: Progressing     Problem: Skin/Tissue Integrity  Goal: Absence of new skin breakdown  Description: 1. Monitor for areas of redness and/or skin breakdown  2. Assess vascular access sites hourly  3. Every 4-6 hours minimum:  Change oxygen saturation probe site  4. Every 4-6 hours:  If on nasal continuous positive airway pressure, respiratory therapy assess nares and determine need for appliance change or resting period.   Outcome: Progressing     Problem: Respiratory - Adult  Goal: Achieves optimal ventilation and oxygenation  Outcome: Progressing     Problem: Cardiovascular - Adult  Goal: Maintains optimal cardiac output and hemodynamic stability  Outcome: Progressing     Problem: Skin/Tissue Integrity - Adult  Goal: Skin integrity remains intact  Outcome: Progressing     Problem: Musculoskeletal - Adult  Goal: Return mobility to safest level of function  Outcome: Progressing     Problem: Infection - Adult  Goal: Absence of infection at discharge  Outcome: Progressing     Problem: Metabolic/Fluid and Electrolytes - Adult  Goal: Electrolytes maintained within normal limits  Outcome: Progressing

## 2023-08-24 NOTE — PROGRESS NOTES
[x] Medication Reconciliation was completed and the patient's home medication list was verified. The Med List Status is \"Complete\". The following sources were used to assist with Medication Reconciliation:    [x] Home medications reviewed and confirmed with patient.

## 2023-08-24 NOTE — CONSULTS
Reason for Consult: Pulmonary edema and elevated troponin    Requesting Physician: Ian Lind MD    CHIEF COMPLAINT: chills    HISTORY OF PRESENT ILLNESS:      The patient is a 77 y.o. female  without prior cardiac history. She has history of hyperlipidemia, no history of hypertension or diabetes mellitus or prior smoking. She has a recent diagnosis of pyelonephritis, E. coli sepsis, acute renal insufficiency, left adrenal mass, left breast mass, and staghorn calculus of the right kidney, and  hypoglycemia. She was discharged on 8/22/2023. The patient refused work-up for the adrenal and breast mass and she changed her CODE STATUS to DNR CCA/DNI    She came back yesterday because the home health nurse found her to have low oxygen without shortness of breath and hyperglycemia and fever. The patient complained of chills, she denies any other symptoms such as chest pain or shortness of breath or cough or urinary symptoms. She states that she was mildly nauseated but she was eating well. She denies any vomiting or diarrhea or abdominal pain. She denies orthopnea or paroxysmal nocturnal dyspnea or legs edema. Patient denies any prior cardiac history. Upon arrival to ED her vitals were stable. She was afebrile. Her O2 saturation was 94% in room air and she has been in room air since admission. Her labs showed elevated BNP 8367 with a troponin of 29 went down to 16. Elevated white blood count of 29 with hemoglobin of 9.4 and neutrophils 92%. UA showed evidence of urinary tract infection. Chest x-ray consistent with a developing pulmonary edema and small pleural effusions. Chest CT without contrast showed small bilateral pleural effusions with small pericardial effusion and diffuse interstitial pulmonary edema,   Patchy nodular and consolidative infiltrate suspicious for multifocal pneumonia. Soft tissue mass in the left breast measuring 2.8 cm. Left adrenal nodule measuring 3.1 cm.   EKG

## 2023-08-24 NOTE — CARE COORDINATION
Discharge Planning    PT eval completed and pt does not need a SNF. Phone call to dtr Manuel Manzano and she would like help with administering IV antibiotics at home. Explained that her Medicare advantage plan would authorize so many nurse visits. Dtr says she would be able to do the med some of the time but would appreciate the help. Reviewed choices of home care agency and she selects [facility]. Referral to [facility] and Koko Aj will have to get visits authorized for nursing. Pt does not want therapy. Stefanie at [address] informed to cancel their nursing services so that [facility] can get an authorization.     Midline in place

## 2023-08-24 NOTE — CARE COORDINATION
Case Management Assessment  Initial Evaluation    Date/Time of Evaluation: 8/24/2023 9:04 AM  Assessment Completed by: Carolene Fabry    If patient is discharged prior to next notation, then this note serves as note for discharge by case management. Patient Name: Adelaida Cano                   YOB: 1945  Diagnosis: Bacteremia [R78.81]  Sepsis ([address]) [A41.9]  Pneumonia due to infectious organism, unspecified laterality, unspecified part of lung [J18.9]                   Date / Time: 8/23/2023  2:04 PM    Patient Admission Status: Inpatient   Readmission Risk (Low < 19, Mod (19-27), High > 27): Readmission Risk Score: 16.2    Current PCP: Brayden Correa MD  PCP verified by CM? Yes    Chart Reviewed: Yes      History Provided by: Patient  Patient Orientation: Alert and Oriented    Patient Cognition: Alert    Hospitalization in the last 30 days (Readmission):  Yes    If yes, Readmission Assessment in  Navigator will be completed. Advance Directives:      Code Status: DNR-CCA   Patient's Primary Decision Maker is: Legal Next of Kin    Primary Decision MakerAcie [facility] - [phone]    Discharge Planning:    Patient lives with: Spouse/Significant Other Type of Home: House  Primary Care Giver: Self  Patient Support Systems include: Spouse/Significant Other, Children   Current Financial resources: Medicare  Current community resources: None  Current services prior to admission: Home Infusion ([facility].  Pt to go to infusion center for midline IV dressing change weekly)            Current DME: Shower Chair, Walker            Type of Home Care services:  Nursing Services    ADLS  Prior functional level: Assistance with the following:, Housework  Current functional level: Assistance with the following:, Housework, Shopping    PT AM-PAC:   /24  OT AM-PAC:   /24    Family can provide assistance at DC: Yes (dtr Milagros Spencer will administer IV med and help her mom during the day while pt's  is at work)  Would you like Case Management to discuss the discharge plan with any other family members/significant others, and if so, who? Yes (dtr Gigi Moreno)  Plans to Return to Present Housing: Yes  Other Identified Issues/Barriers to RETURNING to current housing:  works full time but pt's dtr Gigi Moreno visits daily to administer IV antibiotic and assist her mom as needed  Potential Assistance needed at discharge: Other (Comment) (pt wants to continue IV ATB at home with [facility] services only. Dtr Gigi Moreno will administer IV med)            Potential DME:    Patient expects to discharge to: [address] [place] [address] for transportation at discharge: Family    Financial    Payor: Tyronne Hodgkins / Plan: Tiki Duque HMO / Product Type: *No Product type* /     Does insurance require precert for SNF: Yes    Potential assistance Purchasing Medications:    Meds-to-Beds request:        [facility] #[id number] Elaina Grimm, [address] [place] [address] [phone] Antionette Andrade [phone]  [address] [facility] [id number]  Phone: [phone] Fax: [phone]      Notes:    Factors facilitating achievement of predicted outcomes: Family support, Caregiver support, Motivated, Cooperative, Pleasant, Good insight into deficits, Has needed Durable Medical Equipment at home, and Has homemaker services    Barriers to discharge: elderly with infectious process    Additional Case Management Notes:   Met with pt to review plan of care. Pt lives with her  but he works full time doing [employer] work. Her dtr Gigi Moreno committed to coming every day to administer IV Rocephin for 10 days. Pt was d/c 8-22 and when [facility] infusion nurse made home visit to teach administration pt's vital signs were unstable and she recommended ED visit. Pt states she wants to return home with same previous plan and is not interested in home care or SNF. Pulse ox 94 on room air. Was 89% at home. PT eval pending.   Pt states she is getting

## 2023-08-24 NOTE — PROGRESS NOTES
Occupational Therapy  Facility/Department: [facility] PROGRESSIVE CARE  Occupational Therapy Initial Assessment    Name: Marguerite Lewis  : 1945  MRN: [id number]  Date of Service: 2023    Discharge Recommendations:  Patient would benefit from continued therapy after discharge   Due to recent hospitalization and medical condition, pt would benefit from additional intermittent skilled therapy at time of discharge. Please refer to the AM-PAC score for current functional status. Patient Diagnosis(es): The primary encounter diagnosis was Pneumonia due to infectious organism, unspecified laterality, unspecified part of lung. A diagnosis of Bacteremia was also pertinent to this visit. Past Medical History:  has no past medical history on file. Past Surgical History:  has no past surgical history on file. HPI: Patient is a 39-year-old female with hx of  that presents from home with concerns for worsening condition. Patient was recently admitted to this hospital for septicemia. Patient was found to have positive blood culture for E. coli. This was presumed from urinary tract infection. Patient was admitted with IV antibiotics. Patient was discharged home yesterday. Home health nurse took vitals and patient was significantly tachycardic and was reported to have fever of 102 that was done orally. Patient also had O2 saturation of 89%. Patient daughter states she is not sure patient can get adequate care at home as she cannot be there all the time. Patient was administered her IV antibiotics today. She lives at home with her  that is Trinidadian-speaking. Pt notes dark color urine. Has port in place for IV abx admin. Assessment   Performance deficits / Impairments: Decreased functional mobility ; Decreased ADL status; Decreased strength;Decreased safe awareness;Decreased endurance;Decreased balance;Decreased posture  Assessment: Skilled OT is indicated to increase overall safety awareness in

## 2023-08-24 NOTE — ED NOTES
Writer received a call pt room is not ready unable to transport pt yet, RN will call when room is ready.       Trevor Gaffney RN  08/23/23 9704

## 2023-08-24 NOTE — ACP (ADVANCE CARE PLANNING)
Advance Care Planning   Advance Care Planning Clinical Specialist  Conversation Note      Date of ACP Conversation: 8/24/2023    [facility] Conducted with:   Patient with [address]: Next of Kin by law (only applies in absence of above)     ACP Clinical Specialist: Waleska Pastrana      *When Decision Maker makes decisions on behalf of the incapacitated patient: Decision Maker is asked to consider and make decisions based on patient values, known preferences, or best interests. Current Designated Health Care Decision Maker:   Primary Decision Maker: Ramos Rucker - [phone]  (as entered in [address] field. Validate  this information as still accurate & up-to-date; edit [facility] field as needed.)    If no Decision Maker listed above or available through scanned documents, then:    [address] [place] [address]   Who do you trust to make healthcare decisions for you? Name:   Ginny dorman  Phone  Number: [phone]  Can this person be reached and be able to respond quickly, such as within a few minutes or hours? Yes    Who would be your back-up decision maker? Name Joanne Leonard daughter  Phone Number:[phone]    For below questions, when conducting conversation with [facility], substitute \"she\" and \"her\" for \"you\" and \"your\". Hospitalization  If your health were to worsen and it became clear that your chance of recovery was unlikely, what would your preferences be regarding hospitalization?:    Choice:  []  The patient would want hospitalization  []  The patient would prefer comfort-focused treatment without hospitalization. Ventilation  If you were in your present state of health and suddenly became very ill and were unable to breathe on your own, what would your preference be about the use of a ventilator (breathing machine) if it were available to you?       If patient would desire the use of a ventilator (breathing machine), answer \"yes\", if not \"no\":no    If your health were to worsen and it became clear that your chance of recovery was unlikely, would that change your answer? No    Resuscitation  CPR works best to restart the heart when there is a sudden event, like a heart attack, in someone who is otherwise healthy. Unfortunately, CPR does not typically restart the heart for people who have serious health conditions or who are very sick. In the event your heart stopped, would you want attempts to restart your heart (answer \"yes\") or would you prefer a natural death (answer \"no\")? no    If your health were to worsen and it became clear that your chance of recovery was unlikely, would that change your answer? No    [] Yes  [] No   Educated Patient / Molino Marky regarding differences between Advance Directives and portable DNR orders. Length of ACP Conversation in minutes:  5 minutes.  Nurse is aware of pt's desire for [facility]    Conversation Outcomes:  [x] ACP discussion completed  [] Existing advance directive reviewed with patient; no changes to patient's previously recorded wishes   [] New Advance Directive completed   [] Portable Do Not Rescitate prepared for Provider review and signature  [] POLST/POST/MOLST/MOST prepared for Provider review and signature      Follow-up plan:    [] Schedule follow-up conversation to continue planning  [] Referred individual to Provider for additional questions/concerns   [] Advised patient/agent/surrogate to review completed ACP document and update if needed with changes in condition, patient preferences or care setting     [] This note routed to one or more involved healthcare providers

## 2023-08-24 NOTE — CONSULTS
Nephrology Consult Note    Reason for Consult: Congestive heart failure  Requesting Physician: Dr. Arleth Sparks    Chief Complaint: Shortness of breath  History Obtained From:  patient    History of Present Illness: This is a 77 y.o. female who presents with fever and shortness of breath. The patient was recently discharged from the hospital on the 22nd of this month after being treated for E. coli UTI, she comes back for the above chief complaint, patient is feeling well, she denies any complaint, no shortness of breath at this point, she is on room air, no nausea, vomiting or diarrhea, her blood pressure is stable she is afebrile, her kidney function is stable since admission  To be noted that the patient was seen during her prior admission for acute kidney injury, her creatinine was 3.8, improved to 1 mg/dL at discharge of the 22nd  Past Medical History:    No past medical history on file. Past Surgical History:    No past surgical history on file.     Current Medications:    aspirin EC tablet 81 mg, Daily  levothyroxine (SYNTHROID) tablet 25 mcg, QAM  midodrine (PROAMATINE) tablet 10 mg, TID PRN  atorvastatin (LIPITOR) tablet 20 mg, Nightly  sodium chloride flush 0.9 % injection 10 mL, 2 times per day  sodium chloride flush 0.9 % injection 10 mL, PRN  0.9 % sodium chloride infusion, PRN  potassium chloride (KLOR-CON M) extended release tablet 40 mEq, PRN   Or  potassium bicarb-citric acid (EFFER-K) effervescent tablet 40 mEq, PRN   Or  potassium chloride 10 mEq/100 mL IVPB (Peripheral Line), PRN  magnesium sulfate 1000 mg in dextrose 5% 100 mL IVPB, PRN  enoxaparin (LOVENOX) injection 40 mg, Daily  ondansetron (ZOFRAN-ODT) disintegrating tablet 4 mg, Q8H PRN   Or  ondansetron (ZOFRAN) injection 4 mg, Q6H PRN  senna (SENOKOT) tablet 8.6 mg, BID PRN  acetaminophen (TYLENOL) tablet 650 mg, Q6H PRN   Or  acetaminophen (TYLENOL) suppository 650 mg, Q6H PRN  furosemide (LASIX) injection 40 mg, 8/24/2023  EXAMINATION: CT OF THE CHEST WITHOUT CONTRAST 8/24/2023 12:05 am TECHNIQUE: CT of the chest was performed without the administration of intravenous contrast. Multiplanar reformatted images are provided for review. Automated exposure control, iterative reconstruction, and/or weight based adjustment of the mA/kV was utilized to reduce the radiation dose to as low as reasonably achievable. COMPARISON: Abdomen study on 08/19/2023 HISTORY: ORDERING SYSTEM PROVIDED HISTORY: Pulmonary edema TECHNOLOGIST PROVIDED HISTORY: Pulmonary edema Decision Support Exception - unselect if not a suspected or confirmed emergency medical condition->Emergency Medical Condition (MA) Reason for Exam: Pulmonary edema; no history in chart FINDINGS: Mediastinum: Aortic atherosclerosis. Coronary artery atherosclerotic disease is identified. There is a small pericardial effusion. No focal esophageal thickening is seen. Borderline pretracheal lymph node. Several small subcentimeter lymph nodes seen in the mediastinum which are likely reactive. Lungs/pleura: There are small bilateral effusions. Diffuse interlobular smooth septal thickening is identified. Patchy nodular and consolidative airspace disease is seen. No discrete spiculated lung mass. Upper Abdomen: There is a 3.1 cm left adrenal nodule measuring 32 Hounsfield units with a homogeneous appearance. No right-sided adrenal mass is seen. No acute inflammatory process in the upper abdomen. Soft Tissues/Bones: There is a 2.6 cm soft tissue mass identified in the left breast soft tissue. This is located at the level of the nipple, axial image 81. Multilevel degenerative changes are seen in the spine. No fracture is identified. No acute osseous abnormality is identified. 1. Small bilateral pleural effusions, with a small pericardial effusion and diffuse interstitial pulmonary edema. 2. Patchy nodular and consolidative infiltrates suspicious for multilobar pneumonia. Given left breast mass, consider short interval follow-up CT imaging of the chest to exclude any persistent pulmonary nodules. 3. Soft tissue mass in the left breast measuring 2.6 cm. Correlate with mammographic history and further workup if an unknown finding. 4. There is a left adrenal nodule measuring 3.1 cm, 32 Hounsfield units. Adrenal washout CT or chemical shift MRI is recommended as below. RECOMMENDATIONS: 3.1 cm left adrenal mass, probable benign adenoma. Recommend adrenal washout CT or chemical shift MRI. JACR 2017 Aug; 14(8):1038-44, JCAT 2016 [place]; 40(2):194-200, Urol J 2006 Spring; 3(2):71-4. XR CHEST PORTABLE    Result Date: 8/23/2023  EXAMINATION: ONE XRAY VIEW OF THE CHEST 8/23/2023 2:38 pm COMPARISON: 08/18/2023 HISTORY: ORDERING SYSTEM PROVIDED HISTORY: shortness of breath TECHNOLOGIST PROVIDED HISTORY: shortness of breath Reason for Exam: Blood sugar issue, SOB FINDINGS: Interval progression of vascular congestion with septal thickening and basilar opacities. Ground-glass right suprahilar opacity also appears new. Probable small effusions. No pneumothorax identified. Mild cardiac silhouette enlargement again noted. No acute osseous abnormality identified. Findings compatible with developing pulmonary edema with small pleural effusions. Assessment:  1. Recent acute kidney injury, presenting serum creatinine of 3.8 mg/dL, baseline creatinine 0.7 mg/dL,  paraprotein level negative, resolved  2. Hypokalemia, replaced  3. Presented for shortness of breath, CHF on chest x-ray  4.  ?  Pneumonia  5. Recent E. coli septicemia on Rocephin  6. 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 7. Possible acute left colonic diverticulitis. 8.  Moderate pericardial effusion  9. Left breast mass  10. Anemia  11. Thrombocytopenia  12. Multiple nonobstructing kidney stones  13.   Right complex renal cyst, urology on

## 2023-08-24 NOTE — CONSULTS
[facility]  Initial Consult Note  Date: 8/24/2023    Hospital day :1     Impression:   E. 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 Her white blood cell count is also starting to trend down  We will continue the patient on IV ceftriaxone for now and continue to monitor her clinical progress  The plan is to continue the ceftriaxone through 9/1/2023    Chief complaint/reason for consultation:   Recent diagnosis of E. coli sepsis with concern for multifocal/aspiration pneumonia    History of Present Illness: Hilton Meade is a 77y.o.-year-old female who was initially admitted on 8/23/2023. Patient has a known history of diabetes mellitus type 2, left breast mass and she has refused a biopsy, she was also admitted last week and found to have E. coli sepsis secondary to right-sided pyelonephritis with staghorn calculus and mild hydronephrosis, her CT scan on 8/19/2023 also showed a right renal complex cyst as well as possible acute left-sided colonic diverticulitis. The patient was discharged on 8/22/2023 with plans to continue IV ceftriaxone for an additional 10 days. The patient returned to the emergency department 8/23/2023 after home health care came to her house and noticed she was tachycardic with a temperature of 102, as well as oxygen saturation 89%. Chest x-ray showed developing pulmonary edema with small pleural effusions.   Her white blood cell count was tamponade. Cultures:     Blood Culture 1 [[id number]] Collected: 08/23/23 1521   Order Status: Completed Specimen: Blood Updated: 08/24/23 0551    Specimen Description . BLOOD    Special Requests RFA 12 ML    Culture NO GROWTH 12 HOURS   Culture, Blood 2 [[id number]] Collected: 08/23/23 1515   Order Status: Completed Specimen: Blood Updated: 08/24/23 0551    Specimen Description . BLOOD    Special Requests LARM 12 ML    Culture NO GROWTH 12 HOURS     COVID-19 & Influenza Combo [[id number]] Collected: 08/23/23 1455   Order Status: Completed Specimen: Nasopharyngeal Swab Updated: 08/23/23 1520    Specimen Description . NASOPHARYNGEAL SWAB    Source . NASOPHARYNGEAL SWAB    SARS-CoV-2 RNA, RT PCR Not Detected    Comment:        Testing was performed using PAMELA Nhi SARS-CoV-2 and Influenza A/B nucleic acid assay. This test is a multiplex Real-Time Reverse Transcriptase Polymerase Chain Reaction   (RT-PCR)-based in vitro diagnostic test intended for the qualitative detection of nucleic   acids from SARS-CoV-2,   influenza A, and influenza B in nasopharyngeal and nasal swab specimens for use under the   FDA's Emergency Use Authorization (EUA) only. Not Detected results do not preclude SARS-CoV-2 infection and should not be used as the sole    basis for patient management decisions. Negative results must be combined with clinical observations, patient history, and   epidemiological information.          Fact sheet for Patients: FindDrives.pl   Fact sheet for Healthcare Providers: FindDrives.pl         Results reported to the appropriate Health Department        INFLUENZA A Not Detected    INFLUENZA B Not Detected       Electronically signed by DOUG Phillip CNP on 8/24/2023 at 9:28 AM      [facility]  Himanshu Miller, [address] messaging  OFFICE: [phone]    Thank you for allowing us to

## 2023-08-25 ENCOUNTER — APPOINTMENT (OUTPATIENT)
Dept: GENERAL RADIOLOGY | Age: 78
End: 2023-08-25
Attending: HOSPITALIST
Payer: MEDICARE

## 2023-08-25 LAB
ANION GAP SERPL CALCULATED.3IONS-SCNC: 10 MMOL/L (ref 9–17)
BASOPHILS # BLD: 0.03 K/UL (ref 0–0.2)
BASOPHILS NFR BLD: 0 % (ref 0–2)
BNP SERPL-MCNC: 3653 PG/ML
BUN SERPL-MCNC: 14 MG/DL (ref 8–23)
BUN/CREAT SERPL: 18 (ref 9–20)
CALCIUM SERPL-MCNC: 8.6 MG/DL (ref 8.6–10.4)
CHLORIDE SERPL-SCNC: 106 MMOL/L (ref 98–107)
CO2 SERPL-SCNC: 25 MMOL/L (ref 20–31)
CREAT SERPL-MCNC: 0.8 MG/DL (ref 0.5–0.9)
EKG ATRIAL RATE: 112 BPM
EKG P AXIS: 60 DEGREES
EKG P-R INTERVAL: 116 MS
EKG Q-T INTERVAL: 344 MS
EKG QRS DURATION: 88 MS
EKG QTC CALCULATION (BAZETT): 469 MS
EKG R AXIS: 75 DEGREES
EKG T AXIS: 27 DEGREES
EKG VENTRICULAR RATE: 112 BPM
EOSINOPHIL # BLD: 0.08 K/UL (ref 0–0.44)
EOSINOPHILS RELATIVE PERCENT: 0 % (ref 1–4)
ERYTHROCYTE [DISTWIDTH] IN BLOOD BY AUTOMATED COUNT: 16.7 % (ref 11.8–14.4)
GFR SERPL CREATININE-BSD FRML MDRD: >60 ML/MIN/1.73M2
GLUCOSE BLD-MCNC: 123 MG/DL (ref 65–105)
GLUCOSE BLD-MCNC: 125 MG/DL (ref 65–105)
GLUCOSE BLD-MCNC: 133 MG/DL (ref 65–105)
GLUCOSE BLD-MCNC: 142 MG/DL (ref 65–105)
GLUCOSE SERPL-MCNC: 117 MG/DL (ref 70–99)
HCT VFR BLD AUTO: 26.8 % (ref 36.3–47.1)
HGB BLD-MCNC: 8.3 G/DL (ref 11.9–15.1)
IMM GRANULOCYTES # BLD AUTO: 0.15 K/UL (ref 0–0.3)
IMM GRANULOCYTES NFR BLD: 1 %
LYMPHOCYTES NFR BLD: 1.5 K/UL (ref 1.1–3.7)
LYMPHOCYTES RELATIVE PERCENT: 8 % (ref 24–43)
MAGNESIUM SERPL-MCNC: 1.9 MG/DL (ref 1.6–2.6)
MCH RBC QN AUTO: 28.6 PG (ref 25.2–33.5)
MCHC RBC AUTO-ENTMCNC: 31 G/DL (ref 28.4–34.8)
MCV RBC AUTO: 92.4 FL (ref 82.6–102.9)
MONOCYTES NFR BLD: 0.66 K/UL (ref 0.1–1.2)
MONOCYTES NFR BLD: 4 % (ref 3–12)
NEUTROPHILS NFR BLD: 87 % (ref 36–65)
NEUTS SEG NFR BLD: 16.2 K/UL (ref 1.5–8.1)
NRBC BLD-RTO: 0 PER 100 WBC
PLATELET # BLD AUTO: 273 K/UL (ref 138–453)
PMV BLD AUTO: 10.9 FL (ref 8.1–13.5)
POTASSIUM SERPL-SCNC: 3.3 MMOL/L (ref 3.7–5.3)
RBC # BLD AUTO: 2.9 M/UL (ref 3.95–5.11)
RBC # BLD: ABNORMAL 10*6/UL
SODIUM SERPL-SCNC: 141 MMOL/L (ref 135–144)
WBC OTHER # BLD: 18.6 K/UL (ref 3.5–11.3)

## 2023-08-25 PROCEDURE — 6370000000 HC RX 637 (ALT 250 FOR IP): Performed by: HOSPITALIST

## 2023-08-25 PROCEDURE — 82947 ASSAY GLUCOSE BLOOD QUANT: CPT

## 2023-08-25 PROCEDURE — 85025 COMPLETE CBC W/AUTO DIFF WBC: CPT

## 2023-08-25 PROCEDURE — 83735 ASSAY OF MAGNESIUM: CPT

## 2023-08-25 PROCEDURE — 36415 COLL VENOUS BLD VENIPUNCTURE: CPT

## 2023-08-25 PROCEDURE — 83880 ASSAY OF NATRIURETIC PEPTIDE: CPT

## 2023-08-25 PROCEDURE — 97535 SELF CARE MNGMENT TRAINING: CPT

## 2023-08-25 PROCEDURE — 71045 X-RAY EXAM CHEST 1 VIEW: CPT

## 2023-08-25 PROCEDURE — 99232 SBSQ HOSP IP/OBS MODERATE 35: CPT | Performed by: INTERNAL MEDICINE

## 2023-08-25 PROCEDURE — 80048 BASIC METABOLIC PNL TOTAL CA: CPT

## 2023-08-25 PROCEDURE — 2060000000 HC ICU INTERMEDIATE R&B

## 2023-08-25 PROCEDURE — 2580000003 HC RX 258: Performed by: HOSPITALIST

## 2023-08-25 PROCEDURE — 6360000002 HC RX W HCPCS: Performed by: HOSPITALIST

## 2023-08-25 RX ADMIN — MIDODRINE HYDROCHLORIDE 5 MG: 5 TABLET ORAL at 08:26

## 2023-08-25 RX ADMIN — SODIUM CHLORIDE, PRESERVATIVE FREE 10 ML: 5 INJECTION INTRAVENOUS at 19:40

## 2023-08-25 RX ADMIN — SODIUM CHLORIDE, PRESERVATIVE FREE 10 ML: 5 INJECTION INTRAVENOUS at 08:26

## 2023-08-25 RX ADMIN — LEVOTHYROXINE SODIUM 25 MCG: 25 TABLET ORAL at 06:13

## 2023-08-25 RX ADMIN — ASPIRIN 81 MG: 81 TABLET, COATED ORAL at 08:26

## 2023-08-25 RX ADMIN — ENOXAPARIN SODIUM 40 MG: 100 INJECTION SUBCUTANEOUS at 08:26

## 2023-08-25 RX ADMIN — PANTOPRAZOLE SODIUM 40 MG: 40 TABLET, DELAYED RELEASE ORAL at 06:13

## 2023-08-25 RX ADMIN — CEFTRIAXONE SODIUM 2000 MG: 2 INJECTION, POWDER, FOR SOLUTION INTRAMUSCULAR; INTRAVENOUS at 23:21

## 2023-08-25 RX ADMIN — POTASSIUM CHLORIDE 40 MEQ: 1500 TABLET, EXTENDED RELEASE ORAL at 06:23

## 2023-08-25 RX ADMIN — FUROSEMIDE 40 MG: 10 INJECTION, SOLUTION INTRAMUSCULAR; INTRAVENOUS at 08:26

## 2023-08-25 RX ADMIN — ATORVASTATIN CALCIUM 20 MG: 20 TABLET, FILM COATED ORAL at 19:40

## 2023-08-25 ASSESSMENT — ENCOUNTER SYMPTOMS
RESPIRATORY NEGATIVE: 1
GASTROINTESTINAL NEGATIVE: 1

## 2023-08-25 NOTE — PROGRESS NOTES
Physical Therapy  DATE: 2023    NAME: Bakari Funez  MRN: [id number]   : 1945    Patient not seen this date for Physical Therapy due to:      [] Cancel by RN or physician due to:    [] Hemodialysis    [] Critical Lab Value Level     [] Blood transfusion in progress    [] Acute or unstable cardiovascular status   _MAP < 55 or more than >115  _HR < 40 or > 130    [] Acute or unstable pulmonary status   -FiO2 > 60%   _RR < 5 or >40    _O2 sats < 85%    [] Strict Bedrest    [] Off Unit for surgery or procedure    [] Off Unit for testing       [] Pending imaging to R/O fracture    [x] Refusal by patient d/t fatigue s/p shower. [] Other      [] PT being discontinued at this time. Patient independent. No further needs. [] PT being discontinued at this time as the patient has been transferred to hospice care. No further needs.       Mitcheal Scale, PTA

## 2023-08-25 NOTE — PROGRESS NOTES
Oneida Martinez was evaluated today and a DME order was entered for a wheeled walker with seat because she requires this to successfully complete daily living tasks of ambulating. A wheeled walker with seat is necessary due to the patient's unsteady gait, upper body weakness, inability to  and ambulation device, ambulating only short distances by pushing a walker, and the need to sit for a short time before resuming ambulation. These tasks cannot be completed with a lesser ambulation device such as a cane, crutch, or standard walker. The need for this equipment was discussed with the patient and she understands and is in agreement.

## 2023-08-25 NOTE — PROGRESS NOTES
Dr. Gómez Nine notified of blood pressure 130/56, has order for proamatine,  order received to hold this medication for SBP >110

## 2023-08-25 NOTE — PLAN OF CARE
Shift uneventful, pt up to chair with assist for a few hours, tolerated well, remains alert/oriented with appropriate actions, remains on supplemental oxygen with cont. Pulse ox, SPO2 maintained > 92%, IV antibiotic therapy continues, no c/o pain voiced       Problem: Discharge Planning  Goal: Discharge to home or other facility with appropriate resources  Outcome: Progressing   Possible discharge on 08/26/2023, continue to wait for approval    Problem: Safety - Adult  Goal: Free from fall injury  Outcome: Adequate for Discharge  Pt is a high fall risk per falls risk assessment. Remains free from falls and injuries, call light at reach and utilized appropriately. Bed in lowest position with wheels locked and alarm armed. Personal items that are used frequently are within reach.  Up to chair with assist, no attempts to get out of bed unassisted noted or reported, falling star and hourly rounding implemented, will continue to monitor and educate as needed

## 2023-08-25 NOTE — CARE COORDINATION
Discharge planning    Call to ida at [facility] to clarify if can accept as few days ago she was declined by carlito, JOHN, catherine and joselyn before being set up with [facility]. He is going to check and will call writer. [phone]  Dottie Narvaez unable to accept. Sent referrals to [facility] and [facility]. Also called Stefanie at [facility] to updated may need to have them resume services. [address] with Oliver Corea at [facility] and they did see patient and daughter at home and they were independent and that it was a transportation issue with daughter. [phone]  [facility] can accept if needed but soc cant be until Sunday. Would need to confirm if using as soon as possible     1152  Call from Replaced by [facility] and they can see patient on Sunday prior to 3 pm.      Family up and explained to them that home care has been obtained thru either [facility] or [facility]. Will go with [facility]> also explained they could not be out every day and would need to have someone teachable. Daughter up to room who was taught on Tuesday and stated having transportation issues. Will have RN call her to arrange when and how often they can see patient. Will need home care order. Updated wally MAURICE     [phone]  Updated [facility] that will have RN reserved for Sunday.  IF patient does not get discharged tomorrow will need to notify [facility] immediately

## 2023-08-25 NOTE — PROGRESS NOTES
Occupational Therapy  Facility/Department: [facility] PROGRESSIVE CARE  Rehabilitation Occupational Therapy Daily Treatment Note    Date: 23  Patient Name: Hilton Meade       Room: 1012/1012-02  MRN: [id number]  Account: [de-identified]   : 1945  (74 y.o.) Gender: female           Due to recent hospitalization and medical condition, pt would benefit from additional intermittent skilled therapy at time of discharge. Please refer to the AM-PAC score for current functional status. Past Medical History:  has no past medical history on file. Past Surgical History:   has no past surgical history on file. Restrictions  Restrictions/Precautions: General Precautions, Up as Tolerated  Other position/activity restrictions: PICC, IV, telemetry, up with assist  Required Braces or Orthoses?: No    Subjective  Subjective: Pt in bed upon arrival requesting to shower. Pt stated \"You made my day! \" after showering. Restrictions/Precautions: General Precautions; Up as Tolerated             Objective     Cognition  Overall Cognitive Status: WFL  Orientation  Overall Orientation Status: Within Normal Limits   Perception  Overall Perceptual Status: WFL     ADL  Grooming/Oral Hygiene  Assistance Level: Set-up; Independent  Skilled Clinical Factors: seated to comb hair  Upper Extremity Bathing  Assistance Level: Set-up; Supervision  Skilled Clinical Factors: seated on shower bench  Lower Extremity Bathing  Assistance Level: Set-up; Verbal cues;Stand by assist  Skilled Clinical Factors: both seated and standing in shower for thoroughness  Upper Extremity Dressing  Assistance Level: Set-up; Minimal assistance  Skilled Clinical Factors: seated to change gowns  Lower Extremity Dressing  Assistance Level: Set-up; Verbal cues;Stand by assist  Skilled Clinical Factors: seated to layla pull up brief  Putting On/Taking Off Footwear  Assistance Level: Set-up; Verbal cues; Independent  Skilled Clinical Factors: seated on chair to layla procurement;Positioning    Goals  Patient Goals   Patient goals : to go home  Short Term Goals  Time Frame for Short Term Goals: by discharge, pt will  Short Term Goal 1: demo S/MI with ADL transfers and functional mob in room distances with AD/DME as needed with good safety, pacing for ADL completion  Short Term Goal 2: demo S/MI with toileting routine with DME with good safety  Short Term Goal 3: demo I with UB ADLs and SBA with LB ADLs with AE/DME as needed with good safety/pacing  Short Term Goal 4: demo and verb good understanding of ed provided on fall prevention techs, EC/WS techs, equip needs, B UE HEP, and d/c recommendations    AM-PAC Score        AM-PAC Inpatient Daily Activity Raw Score: 21 (08/25/23 1229)  AM-PAC Inpatient ADL T-Scale Score : 44.27 (08/25/23 1229)  ADL Inpatient CMS 0-100% Score: 32.79 (08/25/23 1229)  ADL Inpatient CMS G-Code Modifier : CJ (08/25/23 1229)      Therapy Time   Individual Concurrent Group Co-treatment   Time In 1122         Time Out 1200         Minutes [address] [facility]

## 2023-08-25 NOTE — PROGRESS NOTES
Progress note  [facility]    Adult Hospitalist      Name: Alondra Ta  MRN: [id number]     Acct: [de-identified]  Room: [facility]/1012-02    Admit Date: 8/23/2023  2:04 PM  PCP: Rivera Lane MD    Primary Problem  Principal Problem:    Sepsis [facility])  Resolved Problems:    * No resolved hospital problems. *        Assesment:     Leukocytosis/sepsis  Pulmonary edema/acute on chronic diastolic CHF  Elevated troponin  Small pleural effusion  Recent E. 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 Patchy nodular and consolidative infiltrates suspicious for multilobar pneumonia. Left breast mass 2.6 cm. Left adrenal nodule. Monitor respiratory status  Pulmonology consult    Lactate and procalcitonin  Blood culture  Continue IV ceftriaxone for now  ID consult      Hold oral hypoglycemic meds  BRAVO    Continue to monitor/telemetry/CBC with differential daily/BMP daily  DVT and GI prophylaxis.   Continue medications as below      Scheduled Meds:   pantoprazole  40 mg Oral QAM AC    aspirin  81 mg Oral Daily    levothyroxine  25 mcg Oral QAM    atorvastatin  20 mg Oral Nightly    sodium chloride flush  10 mL IntraVENous 2 times per day    enoxaparin  40 mg SubCUTAneous Daily    furosemide  40 mg IntraVENous Daily    midodrine  5 mg Oral TID WC    cefTRIAXone (ROCEPHIN) IV  2,000 mg IntraVENous Q24H     Continuous Infusions:   sodium chloride Stopped (08/23/23 1712)     PRN Meds:  midodrine, 10 the right kidney which likely represent intrarenal calculi with the largest measuring 1.3 cm. 2. No evidence of bowel obstruction. CT HEAD WO CONTRAST    Result Date: 8/18/2023  No acute intracranial abnormality. US RENAL COMPLETE    Result Date: 8/18/2023  1. The right kidney contains multiple stones measuring up to 1.3 cm without definite hydronephrosis. 2. The right kidney contains either complicated renal sinus cysts or prominent pyramids containing debris. Consider CT for further evaluation. 3. Unremarkable left kidney and bladder. XR CHEST PORTABLE    Result Date: 8/23/2023  Findings compatible with developing pulmonary edema with small pleural effusions. XR CHEST PORTABLE    Result Date: 8/18/2023  [ 1. Enlargement of cardiac silhouette with cephalization suggesting a CHF exacerbation with pulmonary venous congestion. 2. Minimal right lung base opacity likely representing subsegmental atelectasis although aspiration and a small pneumonia are in the differential diagnosis. All radiological studies reviewed                Code Status:  DNR-CCA    Electronically signed by Chrsis Allen MD on 8/25/2023 at 12:35 PM     Copy sent to Dr. Tanya Yo MD    This note was created with the assistance of a speech-recognition program.  Although the intention is to generate a document that actually reflects the content of the visit, no guarantees can be provided that every mistake has been identified and corrected by editing. Note was updated later by me after  physical examination and  completion of the assessment.

## 2023-08-25 NOTE — PLAN OF CARE
Overnight, this patient's oxygen saturation dropped to 86% while on room air. This RN went into patient's room and placed her on 3L of oxygen via nasal cannula to get her O2 sat higher than 90%. Once patient was maintaining >90%, this nurse dropped patient to 2L nasal cannula. After this, patient remained comfortable and sleeping in bed throughout the night with purewick in place. Problem: Discharge Planning  Goal: Discharge to home or other facility with appropriate resources  Outcome: Progressing  Flowsheets (Taken 8/25/2023 0358)  Discharge to home or other facility with appropriate resources:   Identify discharge learning needs (meds, wound care, etc)   Refer to discharge planning if patient needs post-hospital services based on physician order or complex needs related to functional status, cognitive ability or social support system     Problem: Safety - Adult  Goal: Free from fall injury  Outcome: Progressing     Problem: Skin/Tissue Integrity  Goal: Absence of new skin breakdown  Description: 1. Monitor for areas of redness and/or skin breakdown  2. Assess vascular access sites hourly  3. Every 4-6 hours minimum:  Change oxygen saturation probe site  4. Every 4-6 hours:  If on nasal continuous positive airway pressure, respiratory therapy assess nares and determine need for appliance change or resting period.   Outcome: Progressing     Problem: Respiratory - Adult  Goal: Achieves optimal ventilation and oxygenation  Outcome: Progressing  Flowsheets (Taken 8/25/2023 0358)  Achieves optimal ventilation and oxygenation:   Assess for changes in respiratory status   Assess for changes in mentation and behavior   Position to facilitate oxygenation and minimize respiratory effort   Oxygen supplementation based on oxygen saturation or arterial blood gases     Problem: Cardiovascular - Adult  Goal: Maintains optimal cardiac output and hemodynamic stability  Outcome: Progressing     Problem: Skin/Tissue Integrity - Adult  Goal: Skin integrity remains intact  Outcome: Progressing  Flowsheets (Taken 8/25/2023 0358)  Skin Integrity Remains Intact: Monitor for areas of redness and/or skin breakdown     Problem: Infection - Adult  Goal: Absence of infection at discharge  Outcome: Progressing  Flowsheets (Taken 8/25/2023 0358)  Absence of infection at discharge:   Assess and monitor for signs and symptoms of infection   Monitor lab/diagnostic results   Monitor all insertion sites i.e., indwelling lines, tubes and drains   Administer medications as ordered   Instruct and encourage patient and family to use good hand hygiene technique

## 2023-08-25 NOTE — PROGRESS NOTES
Reason for Consult: Pulmonary edema and elevated troponin    Requesting Physician: Mary Ann Anton MD    Subjective:  Patient states that she feels very good. She denies any shortness of breath or chest pain or cough. She denies dizziness or palpitations or legs edema. She denies any fever or chills. She is not on any oxygen. She is lying in the bed flat comfortably. She states that she worked with physical therapy and Occupational Therapy today without any problem. Monitor shows sinus rhythm. Fluid balance not accurately recorded. Past Medical History:    No past medical history on file. Past Surgical History:    No past surgical history on file. Home Medications:  Prior to Admission medications    Medication Sig Start Date End Date Taking? Authorizing Provider   cefTRIAXone (ROCEPHIN) infusion Infuse 2,000 mg intravenously every 24 hours for 10 days Compound per protocol 8/22/23 9/1/23  Layne Padilla MD   midodrine (PROAMATINE) 10 MG tablet Take 1 tablet by mouth 3 times daily as needed (For systolic pressure less than 100) 8/22/23   Mary Ann Anton MD   ASPIRIN LOW DOSE 81 MG EC tablet Take 1 tablet by mouth daily 8/8/23   Historical Provider, MD   levothyroxine (SYNTHROID) 25 MCG tablet Take 1 tablet by mouth every morning Take on an empty stomach.   Patient not taking: Reported on 8/23/2023 5/12/23   Historical Provider, MD   ondansetron (ZOFRAN-ODT) 4 MG disintegrating tablet dissolve 1 tablet ON TONGUE twice a day if needed for nausea 8/16/23   Historical Provider, MD   Cholecalciferol (VITAMIN D) 50 MCG (2000 UT) CAPS capsule Take 2,000 Units by mouth daily    Historical Provider, MD   simvastatin (ZOCOR) 40 MG tablet Take 1 tablet by mouth nightly    Historical Provider, MD     Current Medications:    Current Facility-Administered Medications   Medication Dose Route Frequency Provider Last Rate Last Admin    pantoprazole (PROTONIX) tablet 40 mg  [facility] AM    K 3.3 2023 05:18 AM     2023 05:18 AM    CO2 25 2023 05:18 AM    BUN 14 2023 05:18 AM    LABALBU 2.7 2023 02:39 PM    LABALBU 3.6 2023 07:43 AM    LABALBU 100 2023 07:43 AM    CREATININE 0.8 2023 05:18 AM    CALCIUM 8.6 2023 05:18 AM    GFRAA 96.1 2023 07:43 AM    LABGLOM >60 2023 05:18 AM    GLUCOSE 117 2023 05:18 AM    GLUCOSE 165 2023 07:43 AM     LIVER PROFILE:  Recent Labs     23  1439   AST 16   ALT 23   LABALBU 2.7*   ALKPHOS 128*   BILITOT 0.3   PROT 6.3*     FLP:    Lab Results   Component Value Date/Time    CHOL 112 2023 07:43 AM    TRIG 185 2023 07:43 AM    HDL 35 2023 07:43 AM    LDLCHOLESTEROL 56 2020 08:30 AM       EC2023 showed sinus tachycardia, incomplete right bundle branch block, no significant T or ST change    CX ray 2023  IMPRESSION:  Cardiomegaly with associated mild pulmonary vascular congestion. Chest X Ray: 2023  IMPRESSION:  Findings compatible with developing pulmonary edema with small pleural  effusions. Chest CT without contrast 2023  1. Small bilateral pleural effusions, with a small pericardial effusion and  diffuse interstitial pulmonary edema. 2. Patchy nodular and consolidative infiltrates suspicious for multilobar  pneumonia. Given left breast mass, consider short interval follow-up CT  imaging of the chest to exclude any persistent pulmonary nodules. 3. Soft tissue mass in the left breast measuring 2.6 cm. Correlate with  mammographic history and further workup if an unknown finding. 4. There is a left adrenal nodule measuring 3.1 cm, 32 Hounsfield units. Adrenal washout CT or chemical shift MRI is recommended as below. RECOMMENDATIONS:  3.1 cm left adrenal mass, probable benign adenoma. Recommend adrenal washout  CT or chemical shift MRI.     ECHO: Date:    Result status: Final result       Left Ventricle:

## 2023-08-26 VITALS
OXYGEN SATURATION: 95 % | SYSTOLIC BLOOD PRESSURE: 115 MMHG | DIASTOLIC BLOOD PRESSURE: 81 MMHG | HEART RATE: 90 BPM | WEIGHT: 152.4 LBS | HEIGHT: 62 IN | BODY MASS INDEX: 28.05 KG/M2 | RESPIRATION RATE: 14 BRPM | TEMPERATURE: 97.9 F

## 2023-08-26 LAB
ANION GAP SERPL CALCULATED.3IONS-SCNC: 7 MMOL/L (ref 9–17)
BASOPHILS # BLD: 0.04 K/UL (ref 0–0.2)
BASOPHILS NFR BLD: 0 % (ref 0–2)
BNP SERPL-MCNC: 2607 PG/ML
BUN SERPL-MCNC: 11 MG/DL (ref 8–23)
BUN/CREAT SERPL: 16 (ref 9–20)
CALCIUM SERPL-MCNC: 9 MG/DL (ref 8.6–10.4)
CHLORIDE SERPL-SCNC: 108 MMOL/L (ref 98–107)
CO2 SERPL-SCNC: 26 MMOL/L (ref 20–31)
CREAT SERPL-MCNC: 0.7 MG/DL (ref 0.5–0.9)
EOSINOPHIL # BLD: 0.11 K/UL (ref 0–0.44)
EOSINOPHILS RELATIVE PERCENT: 1 % (ref 1–4)
ERYTHROCYTE [DISTWIDTH] IN BLOOD BY AUTOMATED COUNT: 16.7 % (ref 11.8–14.4)
GFR SERPL CREATININE-BSD FRML MDRD: >60 ML/MIN/1.73M2
GLUCOSE BLD-MCNC: 108 MG/DL (ref 65–105)
GLUCOSE BLD-MCNC: 117 MG/DL (ref 65–105)
GLUCOSE BLD-MCNC: 127 MG/DL (ref 65–105)
GLUCOSE SERPL-MCNC: 116 MG/DL (ref 70–99)
HCT VFR BLD AUTO: 28.3 % (ref 36.3–47.1)
HGB BLD-MCNC: 8.5 G/DL (ref 11.9–15.1)
IMM GRANULOCYTES # BLD AUTO: 0.14 K/UL (ref 0–0.3)
IMM GRANULOCYTES NFR BLD: 1 %
LYMPHOCYTES NFR BLD: 1.35 K/UL (ref 1.1–3.7)
LYMPHOCYTES RELATIVE PERCENT: 8 % (ref 24–43)
MCH RBC QN AUTO: 28.3 PG (ref 25.2–33.5)
MCHC RBC AUTO-ENTMCNC: 30 G/DL (ref 28.4–34.8)
MCV RBC AUTO: 94.3 FL (ref 82.6–102.9)
MONOCYTES NFR BLD: 0.53 K/UL (ref 0.1–1.2)
MONOCYTES NFR BLD: 3 % (ref 3–12)
NEUTROPHILS NFR BLD: 87 % (ref 36–65)
NEUTS SEG NFR BLD: 14.51 K/UL (ref 1.5–8.1)
NRBC BLD-RTO: 0 PER 100 WBC
PLATELET # BLD AUTO: 333 K/UL (ref 138–453)
PMV BLD AUTO: 11.3 FL (ref 8.1–13.5)
POTASSIUM SERPL-SCNC: 4.7 MMOL/L (ref 3.7–5.3)
RBC # BLD AUTO: 3 M/UL (ref 3.95–5.11)
RBC # BLD: ABNORMAL 10*6/UL
SODIUM SERPL-SCNC: 141 MMOL/L (ref 135–144)
WBC OTHER # BLD: 16.7 K/UL (ref 3.5–11.3)

## 2023-08-26 PROCEDURE — 97110 THERAPEUTIC EXERCISES: CPT

## 2023-08-26 PROCEDURE — 80048 BASIC METABOLIC PNL TOTAL CA: CPT

## 2023-08-26 PROCEDURE — 97530 THERAPEUTIC ACTIVITIES: CPT

## 2023-08-26 PROCEDURE — 85025 COMPLETE CBC W/AUTO DIFF WBC: CPT

## 2023-08-26 PROCEDURE — 83880 ASSAY OF NATRIURETIC PEPTIDE: CPT

## 2023-08-26 PROCEDURE — 97116 GAIT TRAINING THERAPY: CPT

## 2023-08-26 PROCEDURE — 6370000000 HC RX 637 (ALT 250 FOR IP): Performed by: HOSPITALIST

## 2023-08-26 PROCEDURE — 82947 ASSAY GLUCOSE BLOOD QUANT: CPT

## 2023-08-26 PROCEDURE — 99232 SBSQ HOSP IP/OBS MODERATE 35: CPT | Performed by: INTERNAL MEDICINE

## 2023-08-26 PROCEDURE — 2580000003 HC RX 258: Performed by: HOSPITALIST

## 2023-08-26 PROCEDURE — 36415 COLL VENOUS BLD VENIPUNCTURE: CPT

## 2023-08-26 PROCEDURE — 6360000002 HC RX W HCPCS: Performed by: HOSPITALIST

## 2023-08-26 RX ORDER — FUROSEMIDE 40 MG/1
40 TABLET ORAL DAILY
Status: DISCONTINUED | OUTPATIENT
Start: 2023-08-27 | End: 2023-08-26 | Stop reason: HOSPADM

## 2023-08-26 RX ORDER — FUROSEMIDE 40 MG/1
40 TABLET ORAL DAILY
Qty: 60 TABLET | Refills: 0 | Status: SHIPPED | OUTPATIENT
Start: 2023-08-27

## 2023-08-26 RX ADMIN — SODIUM CHLORIDE, PRESERVATIVE FREE 10 ML: 5 INJECTION INTRAVENOUS at 08:22

## 2023-08-26 RX ADMIN — ASPIRIN 81 MG: 81 TABLET, COATED ORAL at 08:22

## 2023-08-26 RX ADMIN — CEFTRIAXONE SODIUM 2000 MG: 2 INJECTION, POWDER, FOR SOLUTION INTRAMUSCULAR; INTRAVENOUS at 19:24

## 2023-08-26 RX ADMIN — ATORVASTATIN CALCIUM 20 MG: 20 TABLET, FILM COATED ORAL at 19:21

## 2023-08-26 RX ADMIN — LEVOTHYROXINE SODIUM 25 MCG: 25 TABLET ORAL at 05:57

## 2023-08-26 RX ADMIN — PANTOPRAZOLE SODIUM 40 MG: 40 TABLET, DELAYED RELEASE ORAL at 05:57

## 2023-08-26 RX ADMIN — ENOXAPARIN SODIUM 40 MG: 100 INJECTION SUBCUTANEOUS at 08:22

## 2023-08-26 RX ADMIN — FUROSEMIDE 40 MG: 10 INJECTION, SOLUTION INTRAMUSCULAR; INTRAVENOUS at 08:22

## 2023-08-26 ASSESSMENT — ENCOUNTER SYMPTOMS
GASTROINTESTINAL NEGATIVE: 1
RESPIRATORY NEGATIVE: 1

## 2023-08-26 NOTE — DISCHARGE SUMMARY
[address] [place] [address].,    Adult Hospitalist      Patient ID: Jodi Callaway  MRN: [id number]     Acct:  [de-identified]       Patient's PCP: Debbie Hinojosa MD    Admit Date: 8/23/2023     Discharge Date:   8/26/23    Admitting Physician: Maxx Ynaes MD    Discharge Physician: Maxx Yanes MD     CONSULTANTS: Patient Care Team:  Debbie Hinojosa MD as PCP - General (Family Medicine)    PROCEDURES PERFORMED:     Active Discharge Diagnoses:  Leukocytosis/sepsis  Pulmonary edema/acute on chronic diastolic CHF  Elevated troponin  Small pleural effusion  Recent E. 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 coli sepsis, left adrenal mass, left breast mass, hypoglycemia discharge on 8/22/2023 presented again to ER due to shortness of breath and hypoglycemia. Patient was seen and evaluated by home health nurse. She was not doing well. She was discharged on IV ceftriaxone. Patient is feeling weak. Daughter was present at the bedside who mentioned to ER that she is unable to take care of her at home. Previously patient has decline treatment and change her CODE STATUS to DNR CCA/DNI. Patient denies any fever but complaining of chills. She denies vomiting but complaining of nausea. Denies any chest pain. WBC was 29.4. WBC was 10.4 on 8/22/2023. Hemoglobin was 9.4. Potassium was 3.6. Creatinine was 1.  proBNP was 8367. High-sensitivity troponin was 29. Blood glucose was 133. Chest x-ray shows findings compatible with developing pulmonary edema with small pleural effusions. Patient admitted for further management. Patient received Lasix 40 mg IV daily.   Cardiology and nephrology physical examination and  completion of the assessment.

## 2023-08-26 NOTE — CARE COORDINATION
Discharge planning    Patient to go home today. Will need home care order and RN did speak with attending about that and [address] Did perfect serve to have completed as discharge order is in without them     1629  Perfect serve to attending to request home care order again. 1646  Still no home care order is in. Just discharge order. Unable to send home for atb with [facility] since new. Call to manager to assist.     1711  Home care order is in.  Sent to [employer] via [employer] with sivan and discharge med list. Call to [facility]

## 2023-08-26 NOTE — CARE COORDINATION
HOME CARE ORDER   Order Requisition for Jose Vega  CSN: [id number]   Order Date:  Aug 26, 2023             Patient Information: Jose Vega       :  1945  Age:  77 y.o. Sex:  F  Home Phone: [phone]  Work Phone:   SSN: [id number]  Address:  [facility]                    [place], [address] [place] [address] MRN:  [id number]  Facility MRN:  [id number]  PCP: Molina Delarosa MD  PCP Phone: [phone]           Ordering Dept: [facility]     Site: [facility] [place]  Ph: [phone] Fax: Address: [address] [place] [address], [address] [place] [address],[address] Ordering User: Jelly No MD  Provider ID: [id number]  NPI:  [id number]               Test Ordered: Inpatient consult to  [address] [address]   Code: Francisco Thomson   ORD #: [id number]  Associated Diagnosis:   Comments: Certification and medical necessity: I certify that, based on my findings, the following services are medically necessary home health services for Jose Vega for the following reasons: - IV Abx  Reason for Consult? Home Care Orders  I certify that I, or a nurse practitioner or physician assistant working with me, had an in-person encounter with the patient and the reason for the home care services is documented in the clinical note on: 2023  Will the referring provider be the attending provider for home health?  No  Name of attending provider for home health: PCP  The patient is confined to home: Due to illness or injury that necessitates supportive device aid, use of special transportation, or assistance of another person to leave home, AND there is a normal inability to leave home, AND leaving home requires considerable taxing effort  Patient requires the following home health services: Skilled Nursing  Patient requires the following home health services: Physical Therapy  Patient requires the following home health services: Occupational Therapy  Patient requires the following home health services: Medical Social tablet  Commonly known as: LASIX  Take 1 tablet by mouth daily  Start taking on: August 27, 2023   levothyroxine 25 MCG tablet  Commonly known as: SYNTHROID  Take 1 tablet by mouth every morning Take on an empty stomach.      CONTINUE taking these medications    CONTINUE taking these medications   Aspirin Low Dose 81 MG EC tablet  Generic drug: aspirin  Take 1 tablet by mouth daily   cefTRIAXone  infusion  Commonly known as: ROCEPHIN  Infuse 2,000 mg intravenously every 24 hours for 10 days Compound per protocol   midodrine 10 MG tablet  Commonly known as: PROAMATINE  Take 1 tablet by mouth 3 times daily as needed (For systolic pressure less than 100)   ondansetron 4 MG disintegrating tablet  Commonly known as: ZOFRAN-ODT  dissolve 1 tablet ON TONGUE twice a day if needed for nausea   simvastatin 40 MG tablet  Commonly known as: ZOCOR  Take 1 tablet by mouth nightly   vitamin D 50 MCG (2000 UT) Caps capsule  Take 2,000 Units by mouth daily   Where to Get Your Medications      These medications were sent to [address] [place] [address], [address] [place] [address] [phone]  furosemide 40 MG tablet

## 2023-08-26 NOTE — CARE COORDINATION
DISCHARGE PLANNING    PATIENT ADMITTED BACK TO BACK. ORDERED IV ROCEPHIN 2 GRAMS DAILY EVERY 24 HOURS FOR 10 DAYS. SCRIPT SENT ON 8/22.  END DATE 9/1    SUPPLIED BY [employer]

## 2023-08-26 NOTE — CARE COORDINATION
Discharge planning    Patient to go home today. Will need home care order and RN did speak with attending about that and [address]. Did perfect serve to have completed as discharge order is in without them     1629  Perfect serve to attending to request home care order again. 1646  Still no home care order is in. Just discharge order.  Unable to send home for atb with [facility] since new

## 2023-08-26 NOTE — PROGRESS NOTES
Pt remained calm and cooperative throughout shift. Pt remained on 3L NC, satting around 94-96%. Vitals stable. Pt's purewick changed and brief changed. Pt had an episode of incontinence and did not know brief was wet. Purewick draining well. No insulin coverage needed.  Will continue with care plan

## 2023-08-26 NOTE — PLAN OF CARE
Problem: Discharge Planning  Goal: Discharge to home or other facility with appropriate resources  8/26/2023 0208 by Aga Pratt RN  Outcome: Progressing  Flowsheets (Taken 8/25/2023 2000)  Discharge to home or other facility with appropriate resources:   Identify barriers to discharge with patient and caregiver   Arrange for needed discharge resources and transportation as appropriate   Identify discharge learning needs (meds, wound care, etc)     Problem: Safety - Adult  Goal: Free from fall injury  8/26/2023 0208 by Aga Pratt RN  Outcome: Progressing     Problem: ABCDS Injury Assessment  Goal: Absence of physical injury  Outcome: Progressing     Problem: Skin/Tissue Integrity  Goal: Absence of new skin breakdown  Description: 1. Monitor for areas of redness and/or skin breakdown  2. Assess vascular access sites hourly  3. Every 4-6 hours minimum:  Change oxygen saturation probe site  4. Every 4-6 hours:  If on nasal continuous positive airway pressure, respiratory therapy assess nares and determine need for appliance change or resting period.   8/26/2023 0208 by Aga Pratt RN  Outcome: Progressing     Problem: Respiratory - Adult  Goal: Achieves optimal ventilation and oxygenation  Outcome: Progressing  Flowsheets (Taken 8/25/2023 2000)  Achieves optimal ventilation and oxygenation:   Assess for changes in respiratory status   Assess for changes in mentation and behavior   Position to facilitate oxygenation and minimize respiratory effort   Oxygen supplementation based on oxygen saturation or arterial blood gases     Problem: Cardiovascular - Adult  Goal: Maintains optimal cardiac output and hemodynamic stability  Outcome: Progressing  Flowsheets (Taken 8/25/2023 2000)  Maintains optimal cardiac output and hemodynamic stability: Monitor blood pressure and heart rate     Problem: Skin/Tissue Integrity - Adult  Goal: Skin integrity remains intact  Outcome: Progressing  Flowsheets (Taken 8/25/2023 2000)  Skin Integrity Remains Intact: Monitor for areas of redness and/or skin breakdown     Problem: Musculoskeletal - Adult  Goal: Return mobility to safest level of function  Outcome: Progressing  Flowsheets (Taken 8/25/2023 2000)  Return Mobility to Safest Level of Function: Assess patient stability and activity tolerance for standing, transferring and ambulating with or without assistive devices     Problem: Infection - Adult  Goal: Absence of infection at discharge  Outcome: Progressing  Flowsheets (Taken 8/25/2023 2000)  Absence of infection at discharge: Assess and monitor for signs and symptoms of infection     Problem: Metabolic/Fluid and Electrolytes - Adult  Goal: Electrolytes maintained within normal limits  Outcome: Progressing  Flowsheets (Taken 8/25/2023 2000)  Electrolytes maintained within normal limits: Monitor labs and assess patient for signs and symptoms of electrolyte imbalances

## 2023-08-26 NOTE — CARE COORDINATION
[id number]        [facility]                                    Req/Control # [Problem retrieving Specimen ID]                                   Order Date:  Aug 26, 2023  [id number]                                          Patient Information      Name:  Alicia Silva  :  1945  Age:  77 y.o. Address:  [place], [place]   Zip:  [address]  PCP: John Farnsworth MD Sex:  F  SSN: [id number]  Home Phone: [phone]  Work Phone:    Patient MRN:  [id number]    Alt Patient ID:  [id number]  PCP Phone: [phone]       Authorizing Provider Information       AUTHORIZING PROVIDER: Yuki Badillo MD  Physician ID: [id number]  NPI:  [id number]  Site:   Address: [address] [place] [address] Avenue: [address],[facility] [address]  Phone: [phone]  Fax: [phone]             EQUIPMENT ORDERED  DME Order for [facility] as OP Naima Sal (ORD   #:   [id number]) Priority  Routine Class  Hospital Performed        Associated Diagnosis:          Comments: You must complete the order parameters below and add the medical necessity documentation for this DME in a separate note.      Folding Walker with Wheels     Current patient weight: Weight - Scale: 152 lb 6.4 oz (69.1 kg)  Current patient height: Height: 5' 2\" (157.5 cm)  Diagnosis: CHF   Duration: Purchase            Scheduling Instructions:                                 Specimen Source             Collection Date    Collection Time    Order Status    Expected Date                 Electronically Signed By  Yuki Badillo MD  NPI:  [id number] Date  Aug 26, 2023  12:20 PM              Responsible Party Kin [place] Information     Guar-ActID   Relationship Account Type Home Phone   [address] [facility] [address] [place], [address] Self P/F [phone]   Employer   Work Phone   [address] Information     Primary Insurance  Insurance/Subscriber ID:  [id number]  [address] [place] [address] Name:  Alicia Silva Relationship to Patient: SelfSigned ABN: N    Payor Name:  Rustyda La   Plan:  Brooke Donis O   Group: [id number]  Worker's Comp Date of Injury:

## 2023-08-26 NOTE — PROGRESS NOTES
Call received from dgtr Brittany, updated and questions answered, voiced concern regarding agency in relationship to administration of IV antibiotics, call deferred to [address] discharge planner

## 2023-08-26 NOTE — CARE COORDINATION
Discharge planning     Spoke with Brittany ( daughter ) and update given. Will need to have discharge order placed for home care for discharge. Have RN for [facility] home care set for tomorrow but was still on IV lasix until today. Will need cardiology, nephrology and pulmonary to sign off. Call to [facility] to discuss possible start of care Monday and they can just need to call them and update tomorrow. RW ordered per MD and did send to [facility] family can call on Monday to see if insurance approved. Number  given in dc instructions.

## 2023-08-26 NOTE — PROGRESS NOTES
Physical Therapy  Facility/Department: [facility]  Daily Treatment Note  NAME: Kyaw Espinoza  : 1945  MRN: [id number]    Date of Service: 2023    Discharge Recommendations:  Patient would benefit from continued therapy after discharge   Patient Diagnosis(es): The primary encounter diagnosis was Pneumonia due to infectious organism, unspecified laterality, unspecified part of lung. Diagnoses of Bacteremia and Ataxia were also pertinent to this visit. Assessment   Assessment: Pt doing well with mobility requiring CGA for safety throughout PT session. AM-PAC score of 18/24 for current level of function. Activity Tolerance: Patient tolerated treatment well     Plan    Physcial Therapy Plan  General Plan: 5-7 times per week  Specific Instructions for Next Treatment: standing balance, HEP  Current Treatment Recommendations: Strengthening;Balance training;Functional mobility training;Transfer training; Endurance training;Gait training;Neuromuscular re-education;Home exercise program;Safety education & training;Patient/Caregiver education & training;Equipment evaluation, education, & procurement; Therapeutic activities     Restrictions  Restrictions/Precautions  Restrictions/Precautions: General Precautions, Up as Tolerated  Required Braces or Orthoses?: No  Position Activity Restriction  Other position/activity restrictions: PICC, IV, telemetry, up with assist     Subjective    Subjective  Subjective: Pt in bed upon arrival however agreeable to PT session (Nurse gives approval for PT session)  Orientation  Overall Orientation Status: Within Normal Limits     Objective      Bed Mobility Training  Overall Level of Assistance: Stand-by assistance;Contact-guard assistance  Interventions: Safety awareness training;Verbal cues  Rolling: Stand-by assistance;Contact-guard assistance  Supine to Sit: Stand-by assistance;Contact-guard assistance  Scooting: Stand-by assistance;Contact-guard

## 2023-08-26 NOTE — PROGRESS NOTES
Physician Progress Note      PATIENT:               Renny Kerr  CSN #:                  [id number]  :                       1945  ADMIT DATE:       2023 2:04 PM  [facility] DATE:  RESPONDING  PROVIDER #:        Chriss Allen MD          QUERY TEXT:    Pt admitted with sepsis and has mild diastolic CHF documented. If possible,   please document in progress notes and discharge summary further specificity   regarding the acuity of CHF:    The medical record reflects the following:  Risk Factors:  Clinical Indicators: presented with sepsis and has mild diastolic CHF   documented, ECHO shows EF 60-65%; CXR shows mild pulmonary vascular congestion   and small bilateral pleural effusions  Treatment: Labs, imaging, monitoring, IV Lasix  Options provided:  -- Acute on Chronic Diastolic CHF/HFpEF  -- Acute Diastolic CHF/HFpEF  -- Chronic Diastolic CHF/HFpEF  -- Other - I will add my own diagnosis  -- Disagree - Not applicable / Not valid  -- Disagree - Clinically unable to determine / Unknown  -- Refer to Clinical Documentation Reviewer    PROVIDER RESPONSE TEXT:    Provider disagreed with this query.   My impression is documented in the note    Query created by: Libby Vicente on 2023 8:22 AM      Electronically signed by:  Chriss Allen MD 2023 3:21 PM

## 2023-08-26 NOTE — PROGRESS NOTES
Reason for Consult: Pulmonary edema and elevated troponin    Requesting Physician: Candy Vines MD    Subjective:  Patient states that she feels very good. She denies any shortness of breath or chest pain or cough. She denies dizziness or palpitations or legs edema. She denies any fever or chills. She is not on any oxygen. She is lying in the bed flat comfortably. She has been ambulating without problem  Monitor shows sinus rhythm. Fluid intake and output are not recorded  Weight is 3 pounds less than yesterday and 13 pounds less than stated weight by the patient on admission    Past Medical History:    No past medical history on file. Past Surgical History:    No past surgical history on file. Home Medications:  Prior to Admission medications    Medication Sig Start Date End Date Taking? Authorizing Provider   furosemide (LASIX) 40 MG tablet Take 1 tablet by mouth daily 8/27/23  Yes Candy Vines MD   cefTRIAXone (ROCEPHIN) infusion Infuse 2,000 mg intravenously every 24 hours for 10 days Compound per protocol 8/22/23 9/1/23  Brenda Kumar MD   midodrine (PROAMATINE) 10 MG tablet Take 1 tablet by mouth 3 times daily as needed (For systolic pressure less than 100) 8/22/23   Candy Vines MD   ASPIRIN LOW DOSE 81 MG EC tablet Take 1 tablet by mouth daily 8/8/23   Historical Provider, MD   levothyroxine (SYNTHROID) 25 MCG tablet Take 1 tablet by mouth every morning Take on an empty stomach.  5/12/23   Historical Provider, MD   ondansetron (ZOFRAN-ODT) 4 MG disintegrating tablet dissolve 1 tablet ON TONGUE twice a day if needed for nausea 8/16/23   Historical Provider, MD   Cholecalciferol (VITAMIN D) 50 MCG (2000 UT) CAPS capsule Take 2,000 Units by mouth daily    Historical Provider, MD   simvastatin (ZOCOR) 40 MG tablet Take 1 tablet by mouth nightly    Historical Provider, MD     Current Medications:    Current Facility-Administered Medications   Medication Dose Route Frequency chemical shift MRI. ECHO: Date: 8/21 /2023   Result status: Final result       Left Ventricle: Normal left ventricular systolic function with a visually estimated EF of 60 - 65%. Left ventricle size is normal. Normal wall thickness. Normal wall motion. Normal diastolic function. Mitral Valve: Trace regurgitation. Tricuspid Valve: Trace regurgitation. Normal RVSP. The estimated RVSP is 24 mmHg. Pericardium: Small (<1 cm) pericardial effusion present. No indication of cardiac tamponade. Stress Test:   non on the records    Angiography:    non on the records      IMPRESSION:    Acute respiratory insufficiency most likely due to pneumonia, if she has any heart failure I think it is mild. Clinically patient is doing very good, she reports good diuresis on Lasix    Probably mild diastolic heart failure, she was started on Lasix IV 40 mg daily and she is diuresing well per her report    Slight elevation of troponin, I think is type II, I think is due to sepsis  pneumonia. Patient did not have chest pain or acute EKG changes and her echo showed normal  systolic function and no wall motion abnormalities. E. coli sepsis  Related to pyelonephritis    Right-sided xanthogranulomatous pyelonephritis. Staghorn calculus and mild right hydronephrosis    Complex right renal cyst with possible debris    Possible obstructive sleep apnea    Obesity    Type 2 diabetes mellitus    Left breast mass refused biopsy     Left adrenal nodule      RECOMMENDATIONS:       Switch Lasix to p.o.     Patient was advised regarding following low-salt diet    Continue antibiotics per ID service    I do not think the patient needs any further cardiac work-up    The patient is stable cardiac wise to be discharged home on current medications    Management plan was discussed with patient         Electronically signed by Komal Schuler MD on 8/26/2023 at 5:32 PM     CC: Maricruz Riddle MD

## 2023-08-27 LAB
MICROORGANISM SPEC CULT: NORMAL
MICROORGANISM SPEC CULT: NORMAL
SERVICE CMNT-IMP: NORMAL
SERVICE CMNT-IMP: NORMAL
SPECIMEN DESCRIPTION: NORMAL
SPECIMEN DESCRIPTION: NORMAL

## 2023-08-27 NOTE — PROGRESS NOTES
Patient discharged to home. Pt left with  via wheelchair to vehicle. Patient education given. Reviewed discharge and follow up instructions with patient. IV & telemetry removed. Midline remains for continued at home antibiotic use. Home health nurse to administer antibiotics. Patient and family at bedside verbalized understanding. Prescriptions and belongings with patient.

## 2023-08-28 ENCOUNTER — TELEPHONE (OUTPATIENT)
Dept: INFECTIOUS DISEASES | Age: 78
End: 2023-08-28

## 2023-08-28 NOTE — TELEPHONE ENCOUNTER
2 things. [facility], [facility] has orders to d/c IV antbx on 9/1, your notes say that too. Infusion company has orders to go until 9/5. Can you clarify stop date? They would also like an order to pull the line after last dose? Can you give that order now?

## 2023-08-29 DIAGNOSIS — N12 PYELONEPHRITIS OF RIGHT KIDNEY: Primary | ICD-10-CM

## 2023-08-29 NOTE — TELEPHONE ENCOUNTER
Garnette Shone, MD  You 9 minutes ago (2:03 PM)       The antibiotics can be stopped after doses on 9/1/2023.    The PICC line can be pulled as well

## 2023-10-13 NOTE — PROGRESS NOTES
Physician Progress Note      PATIENT:               Shayla Mckeon  CSN #:                  [id number]  :                       1945  ADMIT DATE:       2023 2:04 PM  [address] [facility] DATE:        2023 8:47 PM  RESPONDING  PROVIDER #:        Moshe Timmons MD          QUERY TEXT:    Patient admitted with hypoxia/fever recently diagnosed with sepsis. Noted   documentation of pneumonia documented by cardiology and pulmonary consult and   no clinical signs to suggest pneumonia by ID consult. If possible, please document in progress notes and discharge summary if you   are evaluating and /or treating any of the following: The medical record reflects the following:    Risk Factors: recent ecoli sepsis   Clinical Indicators: presents with c/o hypoxia and fever at home, PCT was   elevated 2.41, WBC was elevated 29.4, ID note  states, While the patient   did have fever and hypoxia at home these symptoms have improved and the   patient is currently on room air saturating well with no clinical signs to   suggest pneumonia  Treatment: IV Rocephin to treat ecoli sepsis to be continued through     Thank you! Lena Milner CRCR  RN Clinical   Options provided:  -- Pneumonia POA confirmed after study  -- Pneumonia ruled out after study:;After study, pneumonia ruled out.  -- Other - I will add my own diagnosis  -- Disagree - Not applicable / Not valid  -- Disagree - Clinically unable to determine / Unknown  -- Refer to Clinical Documentation Reviewer    PROVIDER RESPONSE TEXT:    Pneumonia ruled out after study:;After study, pneumonia ruled out.     Query created by: Zuly Butcher on 10/9/2023 3:53 PM      Electronically signed by:  Moshe Timmons MD 10/13/2023 12:47 PM